# Patient Record
Sex: FEMALE | Race: WHITE | NOT HISPANIC OR LATINO | Employment: UNEMPLOYED | URBAN - METROPOLITAN AREA
[De-identification: names, ages, dates, MRNs, and addresses within clinical notes are randomized per-mention and may not be internally consistent; named-entity substitution may affect disease eponyms.]

---

## 2024-04-24 ENCOUNTER — OFFICE VISIT (OUTPATIENT)
Dept: FAMILY MEDICINE CLINIC | Facility: CLINIC | Age: 59
End: 2024-04-24
Payer: COMMERCIAL

## 2024-04-24 ENCOUNTER — LAB (OUTPATIENT)
Dept: LAB | Facility: HOSPITAL | Age: 59
End: 2024-04-24
Payer: COMMERCIAL

## 2024-04-24 VITALS
OXYGEN SATURATION: 98 % | HEART RATE: 80 BPM | SYSTOLIC BLOOD PRESSURE: 124 MMHG | WEIGHT: 171.6 LBS | BODY MASS INDEX: 27.58 KG/M2 | DIASTOLIC BLOOD PRESSURE: 82 MMHG | HEIGHT: 66 IN

## 2024-04-24 DIAGNOSIS — E11.9 TYPE 2 DIABETES MELLITUS WITHOUT COMPLICATION, WITHOUT LONG-TERM CURRENT USE OF INSULIN: ICD-10-CM

## 2024-04-24 DIAGNOSIS — E78.2 MIXED HYPERLIPIDEMIA: ICD-10-CM

## 2024-04-24 DIAGNOSIS — Z80.0 FAMILY HISTORY OF COLON CANCER: ICD-10-CM

## 2024-04-24 DIAGNOSIS — Z11.59 ENCOUNTER FOR HCV SCREENING TEST FOR LOW RISK PATIENT: ICD-10-CM

## 2024-04-24 DIAGNOSIS — Z23 IMMUNIZATION DUE: ICD-10-CM

## 2024-04-24 DIAGNOSIS — Z12.11 SCREENING FOR COLON CANCER: ICD-10-CM

## 2024-04-24 DIAGNOSIS — Z12.31 ENCOUNTER FOR SCREENING MAMMOGRAM FOR MALIGNANT NEOPLASM OF BREAST: ICD-10-CM

## 2024-04-24 DIAGNOSIS — E66.3 OVERWEIGHT (BMI 25.0-29.9): ICD-10-CM

## 2024-04-24 DIAGNOSIS — E03.9 ACQUIRED HYPOTHYROIDISM: ICD-10-CM

## 2024-04-24 DIAGNOSIS — Z00.00 ANNUAL PHYSICAL EXAM: Primary | ICD-10-CM

## 2024-04-24 DIAGNOSIS — Z00.00 ANNUAL PHYSICAL EXAM: ICD-10-CM

## 2024-04-24 LAB
25(OH)D3 SERPL-MCNC: 59 NG/ML (ref 30–100)
ALBUMIN SERPL-MCNC: 4.7 G/DL (ref 3.5–5.2)
ALBUMIN UR-MCNC: 1.4 MG/DL
ALBUMIN/GLOB SERPL: 1.8 G/DL
ALP SERPL-CCNC: 78 U/L (ref 39–117)
ALT SERPL W P-5'-P-CCNC: 13 U/L (ref 1–33)
ANION GAP SERPL CALCULATED.3IONS-SCNC: 12.6 MMOL/L (ref 5–15)
AST SERPL-CCNC: 20 U/L (ref 1–32)
BILIRUB SERPL-MCNC: 0.6 MG/DL (ref 0–1.2)
BUN SERPL-MCNC: 16 MG/DL (ref 6–20)
BUN/CREAT SERPL: 14 (ref 7–25)
CALCIUM SPEC-SCNC: 10 MG/DL (ref 8.6–10.5)
CHLORIDE SERPL-SCNC: 95 MMOL/L (ref 98–107)
CHOLEST SERPL-MCNC: 144 MG/DL (ref 0–200)
CO2 SERPL-SCNC: 29.4 MMOL/L (ref 22–29)
CREAT SERPL-MCNC: 1.14 MG/DL (ref 0.57–1)
CREAT UR-MCNC: 135.3 MG/DL
DEPRECATED RDW RBC AUTO: 37.9 FL (ref 37–54)
EGFRCR SERPLBLD CKD-EPI 2021: 56.6 ML/MIN/1.73
ERYTHROCYTE [DISTWIDTH] IN BLOOD BY AUTOMATED COUNT: 12.2 % (ref 12.3–15.4)
GLOBULIN UR ELPH-MCNC: 2.6 GM/DL
GLUCOSE SERPL-MCNC: 101 MG/DL (ref 65–99)
HBA1C MFR BLD: 6 % (ref 4.8–5.6)
HCT VFR BLD AUTO: 35.3 % (ref 34–46.6)
HCV AB SER QL: NORMAL
HDLC SERPL-MCNC: 59 MG/DL (ref 40–60)
HGB BLD-MCNC: 12 G/DL (ref 12–15.9)
LDLC SERPL CALC-MCNC: 64 MG/DL (ref 0–100)
LDLC/HDLC SERPL: 1.04 {RATIO}
MCH RBC QN AUTO: 29.1 PG (ref 26.6–33)
MCHC RBC AUTO-ENTMCNC: 34 G/DL (ref 31.5–35.7)
MCV RBC AUTO: 85.7 FL (ref 79–97)
MICROALBUMIN/CREAT UR: 10.3 MG/G (ref 0–29)
PLATELET # BLD AUTO: 266 10*3/MM3 (ref 140–450)
PMV BLD AUTO: 10.2 FL (ref 6–12)
POTASSIUM SERPL-SCNC: 3.3 MMOL/L (ref 3.5–5.2)
PROT SERPL-MCNC: 7.3 G/DL (ref 6–8.5)
RBC # BLD AUTO: 4.12 10*6/MM3 (ref 3.77–5.28)
SODIUM SERPL-SCNC: 137 MMOL/L (ref 136–145)
TRIGL SERPL-MCNC: 119 MG/DL (ref 0–150)
TSH SERPL DL<=0.05 MIU/L-ACNC: 0.33 UIU/ML (ref 0.27–4.2)
VLDLC SERPL-MCNC: 21 MG/DL (ref 5–40)
WBC NRBC COR # BLD AUTO: 6.09 10*3/MM3 (ref 3.4–10.8)

## 2024-04-24 PROCEDURE — 80061 LIPID PANEL: CPT

## 2024-04-24 PROCEDURE — 80050 GENERAL HEALTH PANEL: CPT

## 2024-04-24 PROCEDURE — 82306 VITAMIN D 25 HYDROXY: CPT

## 2024-04-24 PROCEDURE — 99386 PREV VISIT NEW AGE 40-64: CPT | Performed by: STUDENT IN AN ORGANIZED HEALTH CARE EDUCATION/TRAINING PROGRAM

## 2024-04-24 PROCEDURE — 90715 TDAP VACCINE 7 YRS/> IM: CPT | Performed by: STUDENT IN AN ORGANIZED HEALTH CARE EDUCATION/TRAINING PROGRAM

## 2024-04-24 PROCEDURE — 86803 HEPATITIS C AB TEST: CPT

## 2024-04-24 PROCEDURE — 99204 OFFICE O/P NEW MOD 45 MIN: CPT | Performed by: STUDENT IN AN ORGANIZED HEALTH CARE EDUCATION/TRAINING PROGRAM

## 2024-04-24 PROCEDURE — 82043 UR ALBUMIN QUANTITATIVE: CPT

## 2024-04-24 PROCEDURE — 83036 HEMOGLOBIN GLYCOSYLATED A1C: CPT

## 2024-04-24 PROCEDURE — 90471 IMMUNIZATION ADMIN: CPT | Performed by: STUDENT IN AN ORGANIZED HEALTH CARE EDUCATION/TRAINING PROGRAM

## 2024-04-24 PROCEDURE — 82570 ASSAY OF URINE CREATININE: CPT

## 2024-04-24 RX ORDER — EZETIMIBE 10 MG/1
1 TABLET ORAL DAILY
COMMUNITY
Start: 2024-01-14

## 2024-04-24 RX ORDER — METFORMIN HYDROCHLORIDE 500 MG/1
2 TABLET, EXTENDED RELEASE ORAL EVERY 12 HOURS SCHEDULED
COMMUNITY
Start: 2024-01-13

## 2024-04-24 RX ORDER — LISINOPRIL AND HYDROCHLOROTHIAZIDE 20; 12.5 MG/1; MG/1
2 TABLET ORAL DAILY
COMMUNITY

## 2024-04-24 RX ORDER — LEVOTHYROXINE SODIUM 88 UG/1
TABLET ORAL
COMMUNITY
Start: 2024-04-17

## 2024-04-24 RX ORDER — ATORVASTATIN CALCIUM 80 MG/1
1 TABLET, FILM COATED ORAL DAILY
COMMUNITY
Start: 2024-04-20

## 2024-04-24 NOTE — LETTER
[facility]  Vaccine Consent Form    Patient Name:  Alisha Quintana  Patient :  1965     Vaccine(s) Ordered    Tdap Vaccine => 6yo IM (BOOSTRIX)        Screening Checklist  The following questions should be completed prior to vaccination. If you answer “yes” to any question, it does not necessarily mean you should not be vaccinated. It just means we may need to clarify or ask more questions. If a question is unclear, please ask your healthcare provider to explain it.    Yes No   Any fever or moderate to severe illness today (mild illness and/or antibiotic treatment are not contraindications)?     Do you have a history of a serious reaction to any previous vaccinations, such as anaphylaxis, encephalopathy within 7 days, Guillain-Memphis syndrome within 6 weeks, seizure?     Have you received any live vaccine(s) (e.g MMR, MAMADOU) or any other vaccines in the last month (to ensure duplicate doses aren't given)?     Do you have an anaphylactic allergy to latex (DTaP, DTaP-IPV, Hep A, Hep B, MenB, RV, Td, Tdap), baker’s yeast (Hep B, HPV), polysorbates (RSV, nirsevimab, PCV 20, Rotavirrus, Tdap, Shingrix), or gelatin (MAMADOU, MMR)?     Do you have an anaphylactic allergy to neomycin (Rabies, MAMADOU, MMR, IPV, Hep A), polymyxin B (IPV), or streptomycin (IPV)?      Any cancer, leukemia, AIDS, or other immune system disorder? (MAMADOU, MMR, RV)     Do you have a parent, brother, or sister with an immune system problem (if immune competence of vaccine recipient clinically verified, can proceed)? (MMR, MAMADOU)     Any recent steroid treatments for >2 weeks, chemotherapy, or radiation treatment? (MAMADOU, MMR)     Have you received antibody-containing blood transfusions or IVIG in the past 11 months (recommended interval is dependent on product)? (MMR, MAMADOU)     Have you taken antiviral drugs (acyclovir, famciclovir, valacyclovir for MAMADOU) in the last 24 or 48 hours, respectively?      Are you pregnant or planning to become pregnant  "within 1 month? (MAMADOU, MMR, HPV, IPV, MenB, Abrexvy; For Hep B- refer to Engerix-B; For RSV - Abrysvo is indicated for 32-36 weeks of pregnancy from September to January)     For infants, have you ever been told your child has had intussusception or a medical emergency involving obstruction of the intestine (Rotavirus)? If not for an infant, can skip this question.         *Ordering Physicians/APC should be consulted if \"yes\" is checked by the patient or guardian above.  I have received, read, and understand the Vaccine Information Statement (VIS) for each vaccine ordered.  I have considered my or my child's health status as well as the health status of my close contacts.  I have taken the opportunity to discuss my vaccine questions with my or my child's health care provider.   I have requested that the ordered vaccine(s) be given to me or my child.  I understand the benefits and risks of the vaccines.  I understand that I should remain in the clinic for 15 minutes after receiving the vaccine(s).  _________________________________________________________  Signature of Patient or Parent/Legal Guardian ____________________  Date     "

## 2024-04-24 NOTE — PROGRESS NOTES
New Patient Office Visit      Patient Name: Alisha Quintana  : 1965   MRN: [id number]   Care Team: Patient Care Team:  Rocio Qureshi DO as PCP - General (Internal Medicine)    Chief Complaint:    Chief Complaint   Patient presents with    new patient preventative medicine service    Hypertension    Rash     Right side chest, itchy        History of Present Illness: Alisha Quintana is a 58 y.o. female who is here today to establish care.  She is  with 3 children. She is not currently working. She recently moved here from [place].     [phone]    HTN - well controlled with Lisinopril -HCTZ     HLD - taking Zetia and atorvastatin. Reports poor diet lately - has been eating out more often and has goals to improve her diet.    T2DM - has not had A1c in about 1 year however she reports previous well controlled around 5. She is compliant with metformin and Ozempic 1mg weekly. Has concerns for recent weight gain, struggles with satiety/cravings. Interested in increasing Ozempic.     Family History  Mother - multiple system atrophy (passed at 63)  Father - HTN, HLD, prostate cancer   Siblings - HTN, HLD  Brother - colon cancer (diagnosed at 45)      Subjective      Review of Systems:   Review of Systems - See HPI    Past Medical History:   Past Medical History:   Diagnosis Date    Diabetes mellitus 2017    Headache     Hyperlipidemia     Hypertension     Renal insufficiency        Past Surgical History:   Past Surgical History:   Procedure Laterality Date    COLONOSCOPY      HYSTERECTOMY  2017       Family History:   Family History   Problem Relation Age of Onset    Other Mother         Multiple system atrophy    Cancer Father     Hyperlipidemia Father     Cancer Brother     Hyperlipidemia Sister        Social History:   Social History     Socioeconomic History    Marital status:    Tobacco Use    Smoking status: Never     "Smokeless tobacco: Never   Vaping Use    Vaping status: Never Used   Substance and Sexual Activity    Alcohol use: Yes     Alcohol/week: 1.0 standard drink of alcohol     Types: 1 Shots of liquor per week    Drug use: Never    Sexual activity: Yes     Partners: Male     Birth control/protection: Hysterectomy     Comment:        Tobacco History:   Social History     Tobacco Use   Smoking Status Never   Smokeless Tobacco Never       Medications:     Current Outpatient Medications:     atorvastatin (LIPITOR) 80 MG tablet, Take 1 tablet by mouth Daily., Disp: , Rfl:     ezetimibe (ZETIA) 10 MG tablet, Take 1 tablet by mouth Daily., Disp: , Rfl:     levothyroxine (SYNTHROID, LEVOTHROID) 88 MCG tablet, take 1 tablet by mouth every day in the morning on an empty stomach, Disp: , Rfl:     lisinopril-hydrochlorothiazide (PRINZIDE,ZESTORETIC) 20-12.5 MG per tablet, Take 2 tablets by mouth Daily., Disp: , Rfl:     metFORMIN ER (GLUCOPHAGE-XR) 500 MG 24 hr tablet, Take 2 tablets by mouth Every 12 (Twelve) Hours., Disp: , Rfl:     Semaglutide (OZEMPIC, 1 MG/DOSE, SC), , Disp: , Rfl:     Allergies:   No Known Allergies    Objective     Physical Exam:  Vital Signs:   Vitals:    04/24/24 0950   BP: 124/82   Pulse: 80   SpO2: 98%   Weight: 77.8 kg (171 lb 9.6 oz)   Height: 167.6 cm (66\")     Body mass index is 27.7 kg/m².     Physical Exam  Vitals reviewed.   Constitutional:       Appearance: Normal appearance. She is not ill-appearing.   Cardiovascular:      Rate and Rhythm: Normal rate and regular rhythm.      Heart sounds: Normal heart sounds. No murmur heard.  Pulmonary:      Effort: Pulmonary effort is normal. No respiratory distress.      Breath sounds: Normal breath sounds. No wheezing.   Abdominal:      General: Abdomen is flat. There is no distension.      Palpations: Abdomen is soft.      Tenderness: There is no abdominal tenderness. There is no guarding or rebound.   Skin:     General: Skin is warm and dry. "   Neurological:      Mental Status: She is alert.   Psychiatric:         Mood and Affect: Mood normal.         Behavior: Behavior normal.         Judgment: Judgment normal.         Assessment / Plan      Assessment/Plan:   Problems Addressed This Visit  Diagnoses and all orders for this visit:    1. Annual physical exam (Primary)  -     CBC (No Diff); Future  -     Lipid Panel; Future  -     Hemoglobin A1c; Future  -     Comprehensive Metabolic Panel; Future  -     TSH; Future  -     Vitamin D,25-Hydroxy; Future  -     Ambulatory Referral For Screening Colonoscopy  -     Mammo Screening Digital Tomosynthesis Bilateral With CAD; Future  -     Hepatitis C antibody; Future    Healthcare Maintenance   Vaccines:  Covid booster declined  Tdap today    Cancer Screening  -Mammogram ordered today  -Colonoscopy due, ordered today. Brother with colon cancer diagnosed age 45  -Pap smear not indicated s/p MARAI for fibroids    Other  -Counseled on importance of maintaining healthy diet and routine exercise. Encouraged 150 min exercise per week.  -Blood pressure at goal   -Metabolic screening today    2. Family history of colon cancer  -     Ambulatory Referral For Screening Colonoscopy    3. Screening for colon cancer  -     Ambulatory Referral For Screening Colonoscopy    4. Encounter for screening mammogram for malignant neoplasm of breast  -     Mammo Screening Digital Tomosynthesis Bilateral With CAD; Future    5. Type 2 diabetes mellitus without complication, without long-term current use of insulin  -     Hemoglobin A1c; Future  -     Microalbumin / Creatinine Urine Ratio - Urine, Clean Catch; Future  Labs today     Continues to struggle with cravings, satiety. Interested in increasing Ozempic from 1mg to 2mg to help with this along with glycemic control. We will obtain labs first and the adjust if appropriate.     Eye exam annually at [facility] for uveitis, no hx of diabetic retinopathy     6. Mixed hyperlipidemia  -      Lipid Panel; Future  Continue statin and zetia  Goal LDL <70    7. Encounter for HCV screening test for low risk patient  -     Hepatitis C antibody; Future    8. Immunization due  -     Tdap Vaccine => 10yo IM (BOOSTRIX)    9. Overweight (BMI 25.0-29.9)  Counseled patient on importance of weight loss and maintaining healthy lifestyle. Recommended dietary modification and 150 minutes of exercise per week.    10. Acquired hypothyroidism  Continue synthroid - TFTs today    Plan of care reviewed with patient at the conclusion of today's visit. Education was provided regarding diagnosis and management.  Patient verbalizes understanding of and agreement with management plan.      Follow Up:   Return in about 3 months (around 7/24/2024).          DO DEEP Lyons RD  [facility]  [address]  [facility] [address]  Fax [phone]  Phone [phone]

## 2024-04-24 NOTE — Clinical Note
Previously following with primary care Dr. Shaggy Thorpe, [facility] [phone] - please request records

## 2024-04-25 DIAGNOSIS — E87.6 HYPOKALEMIA: ICD-10-CM

## 2024-04-25 DIAGNOSIS — E11.9 TYPE 2 DIABETES MELLITUS WITHOUT COMPLICATION, WITHOUT LONG-TERM CURRENT USE OF INSULIN: Primary | ICD-10-CM

## 2024-04-25 RX ORDER — POTASSIUM CHLORIDE 20 MEQ/1
20 TABLET, EXTENDED RELEASE ORAL 2 TIMES DAILY
Qty: 10 TABLET | Refills: 0 | Status: SHIPPED | OUTPATIENT
Start: 2024-04-25

## 2024-04-25 RX ORDER — SEMAGLUTIDE 2.68 MG/ML
2 INJECTION, SOLUTION SUBCUTANEOUS WEEKLY
Qty: 3 ML | Refills: 2 | Status: SHIPPED | OUTPATIENT
Start: 2024-04-25

## 2024-04-26 ENCOUNTER — PATIENT ROUNDING (BHMG ONLY) (OUTPATIENT)
Dept: FAMILY MEDICINE CLINIC | Facility: CLINIC | Age: 59
End: 2024-04-26
Payer: COMMERCIAL

## 2024-04-26 ENCOUNTER — PATIENT OUTREACH (OUTPATIENT)
Dept: FAMILY MEDICINE CLINIC | Facility: CLINIC | Age: 59
End: 2024-04-26
Payer: COMMERCIAL

## 2024-06-18 RX ORDER — SODIUM PICOSULFATE, MAGNESIUM OXIDE, AND ANHYDROUS CITRIC ACID 10; 3.5; 12 MG/160ML; G/160ML; G/160ML
350 LIQUID ORAL TAKE AS DIRECTED
Qty: 350 ML | Refills: 0 | Status: SHIPPED | OUTPATIENT
Start: 2024-06-18

## 2024-06-20 ENCOUNTER — TELEPHONE (OUTPATIENT)
Dept: GASTROENTEROLOGY | Facility: CLINIC | Age: 59
End: 2024-06-20
Payer: COMMERCIAL

## 2024-06-20 RX ORDER — ONDANSETRON 4 MG/1
4 TABLET, FILM COATED ORAL EVERY 6 HOURS
Qty: 6 TABLET | Refills: 0 | Status: SHIPPED | OUTPATIENT
Start: 2024-06-20

## 2024-06-20 RX ORDER — ONDANSETRON 4 MG/1
4 TABLET, FILM COATED ORAL EVERY 6 HOURS
Qty: 6 TABLET | Refills: 0 | Status: SHIPPED | OUTPATIENT
Start: 2024-06-20 | End: 2024-06-20

## 2024-06-20 RX ORDER — ONDANSETRON 4 MG/1
4 TABLET, ORALLY DISINTEGRATING ORAL EVERY 8 HOURS PRN
Qty: 6 TABLET | Refills: 0 | Status: SHIPPED | OUTPATIENT
Start: 2024-06-20

## 2024-06-20 NOTE — TELEPHONE ENCOUNTER
RECEIVED FAX FROM [employer]. ONDANSETRON 4 MG NOT COVERED. ALTERNATIVE COVERED ONDANSETRON 4MG ODT .

## 2024-06-20 NOTE — TELEPHONE ENCOUNTER
WHILE DOING HER CONFIRMATION CALL FOR HER PROCEDURE SCHEDULE ON 6/28 THE PT SAID THAT DURING HER LAST PREP SHE HAD TROUBLE KEEPING THE PREP DOWN AND IS REQUESTING SOME JOVIAN BE CALLED INTO HER PHARMACY TO HELP WITH THIS.

## 2024-06-28 ENCOUNTER — OUTSIDE FACILITY SERVICE (OUTPATIENT)
Dept: GASTROENTEROLOGY | Facility: CLINIC | Age: 59
End: 2024-06-28
Payer: COMMERCIAL

## 2024-06-28 PROCEDURE — 88305 TISSUE EXAM BY PATHOLOGIST: CPT | Performed by: INTERNAL MEDICINE

## 2024-07-01 ENCOUNTER — LAB REQUISITION (OUTPATIENT)
Dept: LAB | Facility: HOSPITAL | Age: 59
End: 2024-07-01
Payer: COMMERCIAL

## 2024-07-01 DIAGNOSIS — K57.30 DIVERTICULOSIS OF LARGE INTESTINE WITHOUT PERFORATION OR ABSCESS WITHOUT BLEEDING: ICD-10-CM

## 2024-07-01 DIAGNOSIS — Z80.0 FAMILY HISTORY OF MALIGNANT NEOPLASM OF DIGESTIVE ORGANS: ICD-10-CM

## 2024-07-01 DIAGNOSIS — Z86.010 PERSONAL HISTORY OF COLONIC POLYPS: ICD-10-CM

## 2024-07-01 DIAGNOSIS — Z12.11 ENCOUNTER FOR SCREENING FOR MALIGNANT NEOPLASM OF COLON: ICD-10-CM

## 2024-07-01 DIAGNOSIS — K64.8 OTHER HEMORRHOIDS: ICD-10-CM

## 2024-07-02 LAB — REF LAB TEST METHOD: NORMAL

## 2024-07-25 ENCOUNTER — LAB (OUTPATIENT)
Dept: LAB | Facility: HOSPITAL | Age: 59
End: 2024-07-25
Payer: COMMERCIAL

## 2024-07-25 ENCOUNTER — OFFICE VISIT (OUTPATIENT)
Dept: FAMILY MEDICINE CLINIC | Facility: CLINIC | Age: 59
End: 2024-07-25
Payer: COMMERCIAL

## 2024-07-25 VITALS
BODY MASS INDEX: 26 KG/M2 | DIASTOLIC BLOOD PRESSURE: 86 MMHG | HEIGHT: 66 IN | SYSTOLIC BLOOD PRESSURE: 124 MMHG | WEIGHT: 161.8 LBS

## 2024-07-25 DIAGNOSIS — R51.9 RECURRENT OCCIPITAL HEADACHE: ICD-10-CM

## 2024-07-25 DIAGNOSIS — E78.2 MIXED HYPERLIPIDEMIA: ICD-10-CM

## 2024-07-25 DIAGNOSIS — E11.9 TYPE 2 DIABETES MELLITUS WITHOUT COMPLICATION, WITHOUT LONG-TERM CURRENT USE OF INSULIN: ICD-10-CM

## 2024-07-25 DIAGNOSIS — E87.6 HYPOKALEMIA: ICD-10-CM

## 2024-07-25 DIAGNOSIS — G47.9 SLEEPING DIFFICULTY: ICD-10-CM

## 2024-07-25 DIAGNOSIS — E11.9 TYPE 2 DIABETES MELLITUS WITHOUT COMPLICATION, WITHOUT LONG-TERM CURRENT USE OF INSULIN: Primary | ICD-10-CM

## 2024-07-25 LAB
EXPIRATION DATE: NORMAL
HBA1C MFR BLD: 5.4 % (ref 4.5–5.7)
Lab: NORMAL

## 2024-07-25 PROCEDURE — 80048 BASIC METABOLIC PNL TOTAL CA: CPT

## 2024-07-25 PROCEDURE — 99214 OFFICE O/P EST MOD 30 MIN: CPT | Performed by: STUDENT IN AN ORGANIZED HEALTH CARE EDUCATION/TRAINING PROGRAM

## 2024-07-25 PROCEDURE — 83036 HEMOGLOBIN GLYCOSYLATED A1C: CPT | Performed by: STUDENT IN AN ORGANIZED HEALTH CARE EDUCATION/TRAINING PROGRAM

## 2024-07-25 RX ORDER — EZETIMIBE 10 MG/1
10 TABLET ORAL DAILY
Qty: 90 TABLET | Refills: 0 | Status: SHIPPED | OUTPATIENT
Start: 2024-07-25

## 2024-07-25 RX ORDER — SEMAGLUTIDE 2.68 MG/ML
2 INJECTION, SOLUTION SUBCUTANEOUS WEEKLY
Qty: 9 ML | Refills: 3 | Status: SHIPPED | OUTPATIENT
Start: 2024-07-25

## 2024-07-25 RX ORDER — TRAZODONE HYDROCHLORIDE 50 MG/1
50 TABLET ORAL NIGHTLY
Qty: 30 TABLET | Refills: 2 | Status: SHIPPED | OUTPATIENT
Start: 2024-07-25

## 2024-07-25 NOTE — PROGRESS NOTES
Established Office Visit      Patient Name: Alisha Quintana  : 1965   MRN: [id number]   Care Team: Patient Care Team:  Rocio Qureshi DO as PCP - General (Internal Medicine)    Chief Complaint:    Chief Complaint   Patient presents with    Diabetes    Obesity    Insomnia    Headache       History of Present Illness: Alisha Quintana is a 59 y.o. female with HTN, HLD, T2DM who is here today to follow up with chronic medical problems.    Reports difficulty with sleep off and over the past several years. Over the past few months it has been particularly worse. Has difficulty falling asleep some nights. She wakes up around 3am and cannot fall back asleep. She tries to follow same sleep/wake cycle each day - goes to bed around 10 and would like to wake up around 7am each day.  Melatonin, unisom, advil PM ineffective.   No snoring or witnessed apneic episodes. She has had sleep study in the past which reports was normal. She does not feel rested when she wakes up.     Reports history of headaches off and on since age 18. Particularly irritating lately. She is following with PT and dry needling has been helpful. Describes headaches as always starting at posterior base and radiating upwards throughout skull. Occurring all day. No specific pattern.     T2DM - compliant with ozempic and metformin. Tolerating this well. No side effects.   She has lost 10lb in the past 3 months.       Subjective      Review of Systems:   Review of Systems - See HPI    I have reviewed and the following portions of the patient's history were updated as appropriate: past family history, past medical history, past social history, past surgical history and problem list.    Medications:     Current Outpatient Medications:     atorvastatin (LIPITOR) 80 MG tablet, Take 1 tablet by mouth Daily., Disp: , Rfl:     ezetimibe (ZETIA) 10 MG tablet, Take 1 tablet by mouth Daily., Disp: 90 tablet, Rfl: 0    levothyroxine (SYNTHROID,  "LEVOTHROID) 88 MCG tablet, take 1 tablet by mouth every day in the morning on an empty stomach, Disp: , Rfl:     lisinopril-hydrochlorothiazide (PRINZIDE,ZESTORETIC) 20-12.5 MG per tablet, Take 2 tablets by mouth Daily., Disp: , Rfl:     metFORMIN ER (GLUCOPHAGE-XR) 500 MG 24 hr tablet, Take 2 tablets by mouth Every 12 (Twelve) Hours., Disp: , Rfl:     ondansetron (ZOFRAN) 4 MG tablet, Take 1 tablet by mouth Every 6 (Six) Hours., Disp: 6 tablet, Rfl: 0    ondansetron ODT (ZOFRAN-ODT) 4 MG disintegrating tablet, Place 1 tablet on the tongue Every 8 (Eight) Hours As Needed for Nausea or Vomiting (FOR COLONOSCOPY PREP)., Disp: 6 tablet, Rfl: 0    Semaglutide, 2 MG/DOSE, (Ozempic, 2 MG/DOSE,) 8 MG/3ML solution pen-injector, Inject 2 mg under the skin into the appropriate area as directed 1 (One) Time Per Week., Disp: 9 mL, Rfl: 3    Sod Picosulfate-Mag Ox-Cit Acd (Clenpiq) 10-3.5-12 MG-GM -GM/160ML solution, Take 350 mL by mouth Take As Directed., Disp: 350 mL, Rfl: 0    traZODone (DESYREL) 50 MG tablet, Take 1 tablet by mouth Every Night., Disp: 30 tablet, Rfl: 2    Allergies:   No Known Allergies    Objective     Physical Exam:  Vital Signs:   Vitals:    07/25/24 1058   BP: 124/86   Weight: 73.4 kg (161 lb 12.8 oz)   Height: 167.6 cm (66\")     Body mass index is 26.12 kg/m².       Assessment / Plan      Assessment/Plan:   Problems Addressed This Visit  Diagnoses and all orders for this visit:    1. Type 2 diabetes mellitus without complication, without long-term current use of insulin (Primary)  -     POC Glycosylated Hemoglobin (Hb A1C)  -     ezetimibe (ZETIA) 10 MG tablet; Take 1 tablet by mouth Daily.  Dispense: 90 tablet; Refill: 0  -     Semaglutide, 2 MG/DOSE, (Ozempic, 2 MG/DOSE,) 8 MG/3ML solution pen-injector; Inject 2 mg under the skin into the appropriate area as directed 1 (One) Time Per Week.  Dispense: 9 mL; Refill: 3  -     Basic metabolic panel; Future    A1c very well controlled 5.4%   Continue " Metformin and Ozempic   Continue ACEi and statin     2. Mixed hyperlipidemia  -     ezetimibe (ZETIA) 10 MG tablet; Take 1 tablet by mouth Daily.  Dispense: 90 tablet; Refill: 0  At goal LDL <70 with zetia and statin    3. Hypokalemia  -     Basic metabolic panel; Future  Likely related to HCTZ. If persistent - consider alternative antihypertensive options     4. Sleeping difficulty  -     traZODone (DESYREL) 50 MG tablet; Take 1 tablet by mouth Every Night.  Dispense: 30 tablet; Refill: 2  Encouraged healthy sleep hygiene habits including avoiding late night meals/EtOH, keeping room dark and quiet, avoiding screen time prior to bed, and maintaining consistent sleep/wake schedule throughout the week.  Switch lisinopril HCTZ to morning to help with nocturia   Trial of trazodone prn     5. Recurrent occipital headache  Cervicogenic vs occipital neuralgia?   Discussed treatment options  She would like to avoid further medication (ie amitriptyline) and instead try optimizing sleep with trazodone trial as above  Continue dry needling. Could consider referral to neuro to consider occipital nerve block         Plan of care reviewed with patient at the conclusion of today's visit. Education was provided regarding diagnosis and management.  Patient verbalizes understanding of and agreement with management plan.    Follow Up:   Return for 6 weeks for sleep and headache follow up .        DO DEEP Lyons RD  [facility]  [address]  [facility] [phone]  Fax [phone]  Phone [phone]

## 2024-07-26 LAB
ANION GAP SERPL CALCULATED.3IONS-SCNC: 14.1 MMOL/L (ref 5–15)
BUN SERPL-MCNC: 10 MG/DL (ref 6–20)
BUN/CREAT SERPL: 8.8 (ref 7–25)
CALCIUM SPEC-SCNC: 10 MG/DL (ref 8.6–10.5)
CHLORIDE SERPL-SCNC: 95 MMOL/L (ref 98–107)
CO2 SERPL-SCNC: 28.9 MMOL/L (ref 22–29)
CREAT SERPL-MCNC: 1.13 MG/DL (ref 0.57–1)
EGFRCR SERPLBLD CKD-EPI 2021: 56.9 ML/MIN/1.73
GLUCOSE SERPL-MCNC: 95 MG/DL (ref 65–99)
POTASSIUM SERPL-SCNC: 3.6 MMOL/L (ref 3.5–5.2)
SODIUM SERPL-SCNC: 138 MMOL/L (ref 136–145)

## 2024-07-29 DIAGNOSIS — E78.2 MIXED HYPERLIPIDEMIA: ICD-10-CM

## 2024-07-29 DIAGNOSIS — E11.9 TYPE 2 DIABETES MELLITUS WITHOUT COMPLICATION, WITHOUT LONG-TERM CURRENT USE OF INSULIN: ICD-10-CM

## 2024-07-29 RX ORDER — EZETIMIBE 10 MG/1
10 TABLET ORAL DAILY
Qty: 90 TABLET | Refills: 0 | OUTPATIENT
Start: 2024-07-29

## 2024-08-01 ENCOUNTER — HOSPITAL ENCOUNTER (OUTPATIENT)
Dept: MAMMOGRAPHY | Facility: HOSPITAL | Age: 59
Discharge: HOME | End: 2024-08-01
Admitting: STUDENT IN AN ORGANIZED HEALTH CARE EDUCATION/TRAINING PROGRAM
Payer: COMMERCIAL

## 2024-08-01 DIAGNOSIS — Z00.00 ANNUAL PHYSICAL EXAM: ICD-10-CM

## 2024-08-01 DIAGNOSIS — Z12.31 ENCOUNTER FOR SCREENING MAMMOGRAM FOR MALIGNANT NEOPLASM OF BREAST: ICD-10-CM

## 2024-08-01 PROCEDURE — 77067 SCR MAMMO BI INCL CAD: CPT

## 2024-08-01 PROCEDURE — 77063 BREAST TOMOSYNTHESIS BI: CPT

## 2024-09-10 ENCOUNTER — TELEMEDICINE (OUTPATIENT)
Dept: FAMILY MEDICINE CLINIC | Facility: CLINIC | Age: 59
End: 2024-09-10
Payer: COMMERCIAL

## 2024-09-10 DIAGNOSIS — E11.9 TYPE 2 DIABETES MELLITUS WITHOUT COMPLICATION, WITHOUT LONG-TERM CURRENT USE OF INSULIN: ICD-10-CM

## 2024-09-10 DIAGNOSIS — G47.9 SLEEPING DIFFICULTY: Primary | ICD-10-CM

## 2024-09-10 DIAGNOSIS — N18.31 STAGE 3A CHRONIC KIDNEY DISEASE: ICD-10-CM

## 2024-09-10 PROCEDURE — 99214 OFFICE O/P EST MOD 30 MIN: CPT | Performed by: STUDENT IN AN ORGANIZED HEALTH CARE EDUCATION/TRAINING PROGRAM

## 2024-09-10 RX ORDER — TRAZODONE HYDROCHLORIDE 100 MG/1
100 TABLET ORAL NIGHTLY
Qty: 30 TABLET | Refills: 5 | Status: SHIPPED | OUTPATIENT
Start: 2024-09-10

## 2024-09-11 VITALS — BODY MASS INDEX: 24.75 KG/M2 | HEIGHT: 66 IN | WEIGHT: 154 LBS

## 2024-11-08 DIAGNOSIS — E03.9 ACQUIRED HYPOTHYROIDISM: Primary | ICD-10-CM

## 2024-11-08 DIAGNOSIS — E11.9 TYPE 2 DIABETES MELLITUS WITHOUT COMPLICATION, WITHOUT LONG-TERM CURRENT USE OF INSULIN: ICD-10-CM

## 2024-11-08 DIAGNOSIS — E78.2 MIXED HYPERLIPIDEMIA: ICD-10-CM

## 2024-11-08 DIAGNOSIS — I10 PRIMARY HYPERTENSION: ICD-10-CM

## 2024-11-11 RX ORDER — EZETIMIBE 10 MG/1
10 TABLET ORAL DAILY
Qty: 90 TABLET | Refills: 0 | Status: SHIPPED | OUTPATIENT
Start: 2024-11-11

## 2024-11-11 RX ORDER — LISINOPRIL AND HYDROCHLOROTHIAZIDE 12.5; 2 MG/1; MG/1
2 TABLET ORAL DAILY
Qty: 60 TABLET | Refills: 2 | Status: SHIPPED | OUTPATIENT
Start: 2024-11-11

## 2024-11-11 RX ORDER — ATORVASTATIN CALCIUM 80 MG/1
80 TABLET, FILM COATED ORAL DAILY
Qty: 90 TABLET | Refills: 3 | Status: SHIPPED | OUTPATIENT
Start: 2024-11-11

## 2024-11-11 RX ORDER — LEVOTHYROXINE SODIUM 88 UG/1
88 TABLET ORAL DAILY
Qty: 30 TABLET | Refills: 5 | Status: SHIPPED | OUTPATIENT
Start: 2024-11-11

## 2024-11-11 RX ORDER — SEMAGLUTIDE 2.68 MG/ML
2 INJECTION, SOLUTION SUBCUTANEOUS WEEKLY
Qty: 9 ML | Refills: 1 | Status: SHIPPED | OUTPATIENT
Start: 2024-11-11

## 2024-11-11 NOTE — TELEPHONE ENCOUNTER
Rx Refill Note  Requested Prescriptions     Pending Prescriptions Disp Refills    atorvastatin (LIPITOR) 80 MG tablet 90 tablet      Sig: Take 1 tablet by mouth Daily.    levothyroxine (SYNTHROID, LEVOTHROID) 88 MCG tablet      lisinopril-hydrochlorothiazide (PRINZIDE,ZESTORETIC) 20-12.5 MG per tablet       Sig: Take 2 tablets by mouth Daily.     Signed Prescriptions Disp Refills    ezetimibe (ZETIA) 10 MG tablet 90 tablet 0     Sig: Take 1 tablet by mouth Daily.     Authorizing Provider: TERELL LOZANO     Ordering User: GIOVANA JORDAN    Semaglutide, 2 MG/DOSE, (Ozempic, 2 MG/DOSE,) 8 MG/3ML solution pen-injector 9 mL 1     Sig: Inject 2 mg under the skin into the appropriate area as directed 1 (One) Time Per Week.     Authorizing Provider: TERELL LOZANO     Ordering User: GIOVANA JORDAN      Last office visit with prescribing clinician: 7/25/2024   Last telemedicine visit with prescribing clinician: 9/10/2024   Next office visit with prescribing clinician: 1/15/2025                         Would you like a call back once the refill request has been completed: [] Yes [] No    If the office needs to give you a call back, can they leave a voicemail: [] Yes [] No    Giovana Jordan MA  11/11/24, 07:49 EST

## 2024-11-13 DIAGNOSIS — E78.2 MIXED HYPERLIPIDEMIA: ICD-10-CM

## 2024-11-13 DIAGNOSIS — E03.9 ACQUIRED HYPOTHYROIDISM: ICD-10-CM

## 2024-11-13 DIAGNOSIS — E11.9 TYPE 2 DIABETES MELLITUS WITHOUT COMPLICATION, WITHOUT LONG-TERM CURRENT USE OF INSULIN: ICD-10-CM

## 2024-11-13 DIAGNOSIS — I10 PRIMARY HYPERTENSION: ICD-10-CM

## 2024-11-14 RX ORDER — LISINOPRIL AND HYDROCHLOROTHIAZIDE 12.5; 2 MG/1; MG/1
2 TABLET ORAL DAILY
Qty: 60 TABLET | Refills: 2 | OUTPATIENT
Start: 2024-11-14

## 2024-11-14 RX ORDER — ATORVASTATIN CALCIUM 80 MG/1
80 TABLET, FILM COATED ORAL DAILY
Qty: 90 TABLET | Refills: 3 | OUTPATIENT
Start: 2024-11-14

## 2024-11-14 RX ORDER — LEVOTHYROXINE SODIUM 88 UG/1
88 TABLET ORAL DAILY
Qty: 30 TABLET | Refills: 5 | OUTPATIENT
Start: 2024-11-14

## 2024-11-14 RX ORDER — SEMAGLUTIDE 2.68 MG/ML
2 INJECTION, SOLUTION SUBCUTANEOUS WEEKLY
Qty: 9 ML | Refills: 1 | OUTPATIENT
Start: 2024-11-14

## 2024-11-14 RX ORDER — EZETIMIBE 10 MG/1
10 TABLET ORAL DAILY
Qty: 90 TABLET | Refills: 0 | OUTPATIENT
Start: 2024-11-14

## 2024-11-22 ENCOUNTER — OFFICE VISIT (OUTPATIENT)
Dept: FAMILY MEDICINE CLINIC | Facility: CLINIC | Age: 59
End: 2024-11-22
Payer: COMMERCIAL

## 2024-11-22 VITALS
HEIGHT: 66 IN | SYSTOLIC BLOOD PRESSURE: 122 MMHG | WEIGHT: 160.4 LBS | DIASTOLIC BLOOD PRESSURE: 80 MMHG | BODY MASS INDEX: 25.78 KG/M2 | TEMPERATURE: 98.7 F | OXYGEN SATURATION: 99 % | HEART RATE: 80 BPM

## 2024-11-22 DIAGNOSIS — J40 BRONCHITIS: Primary | ICD-10-CM

## 2024-11-22 LAB
EXPIRATION DATE: NORMAL
FLUAV AG UPPER RESP QL IA.RAPID: NOT DETECTED
FLUBV AG UPPER RESP QL IA.RAPID: NOT DETECTED
INTERNAL CONTROL: NORMAL
Lab: NORMAL
SARS-COV-2 AG UPPER RESP QL IA.RAPID: NOT DETECTED

## 2024-11-22 PROCEDURE — 87428 SARSCOV & INF VIR A&B AG IA: CPT | Performed by: STUDENT IN AN ORGANIZED HEALTH CARE EDUCATION/TRAINING PROGRAM

## 2024-11-22 PROCEDURE — 99213 OFFICE O/P EST LOW 20 MIN: CPT | Performed by: STUDENT IN AN ORGANIZED HEALTH CARE EDUCATION/TRAINING PROGRAM

## 2024-11-22 RX ORDER — BROMPHENIRAMINE MALEATE, PSEUDOEPHEDRINE HYDROCHLORIDE, AND DEXTROMETHORPHAN HYDROBROMIDE 2; 30; 10 MG/5ML; MG/5ML; MG/5ML
5 SYRUP ORAL 4 TIMES DAILY PRN
Qty: 118 ML | Refills: 1 | Status: SHIPPED | OUTPATIENT
Start: 2024-11-22

## 2024-11-22 NOTE — PROGRESS NOTES
Established Office Visit      Patient Name: Alisha Quintana  : 1965   MRN: [id number]   Care Team: Patient Care Team:  Rocio Qureshi DO as PCP - General (Internal Medicine)    Chief Complaint:    Chief Complaint   Patient presents with    Headache     X3 weeks     Ear Fullness       History of Present Illness: Alisha Quintana is a 59 y.o. female with HTN, HLD, T2DM, CKD3a, insomnia who is here today to follow up with headache and ear pain.    Patient reports about 3 weeks ago she felt congestion, runny nose. She took some left over prednisone and symptoms improved temporarily. About 1 week ago she reports symptoms once again worsened cough with mucus production. She has sore throat which she feels is raw from coughing.   No SOA, wheezing, fever. Denies GI symptoms.   She is taking dayquil, nyquil, cough syrup and cough drops without improvement.     Subjective      Review of Systems:   Review of Systems - See HPI    I have reviewed and the following portions of the patient's history were updated as appropriate: past family history, past medical history, past social history, past surgical history and problem list.    Medications:     Current Outpatient Medications:     atorvastatin (LIPITOR) 80 MG tablet, Take 1 tablet by mouth Daily., Disp: 90 tablet, Rfl: 3    ezetimibe (ZETIA) 10 MG tablet, Take 1 tablet by mouth Daily., Disp: 90 tablet, Rfl: 0    levothyroxine (SYNTHROID, LEVOTHROID) 88 MCG tablet, Take 1 tablet by mouth Daily., Disp: 30 tablet, Rfl: 5    lisinopril-hydrochlorothiazide (PRINZIDE,ZESTORETIC) 20-12.5 MG per tablet, Take 2 tablets by mouth Daily., Disp: 60 tablet, Rfl: 2    metFORMIN ER (GLUCOPHAGE-XR) 500 MG 24 hr tablet, Take 2 tablets by mouth Every 12 (Twelve) Hours., Disp: , Rfl:     ondansetron ODT (ZOFRAN-ODT) 4 MG disintegrating tablet, Place 1 tablet on the tongue Every 8 (Eight) Hours As Needed for Nausea or Vomiting (FOR COLONOSCOPY PREP)., Disp: 6 tablet, Rfl:  "0    Semaglutide, 2 MG/DOSE, (Ozempic, 2 MG/DOSE,) 8 MG/3ML solution pen-injector, Inject 2 mg under the skin into the appropriate area as directed 1 (One) Time Per Week., Disp: 9 mL, Rfl: 1    traZODone (DESYREL) 100 MG tablet, Take 1 tablet by mouth Every Night., Disp: 30 tablet, Rfl: 5    amoxicillin-clavulanate (AUGMENTIN) 875-125 MG per tablet, Take 1 tablet by mouth 2 (Two) Times a Day for 7 days., Disp: 14 tablet, Rfl: 0    brompheniramine-pseudoephedrine-DM 30-2-10 MG/5ML syrup, Take 5 mL by mouth 4 (Four) Times a Day As Needed for Cough or Congestion., Disp: 118 mL, Rfl: 1    Allergies:   No Known Allergies    Objective     Physical Exam:  Vital Signs:   Vitals:    11/22/24 0912   BP: 122/80   Pulse: 80   Temp: 98.7 °F (37.1 °C)   SpO2: 99%   Weight: 72.8 kg (160 lb 6.4 oz)   Height: 167.6 cm (66\")     Body mass index is 25.89 kg/m².     Physical Exam  Vitals reviewed.   Constitutional:       Appearance: Normal appearance. She is ill-appearing.      Comments: Hoarse voice quality    HENT:      Right Ear: Tympanic membrane, ear canal and external ear normal.      Left Ear: Tympanic membrane, ear canal and external ear normal.      Nose: Congestion present.      Mouth/Throat:      Pharynx: No oropharyngeal exudate or posterior oropharyngeal erythema.   Cardiovascular:      Rate and Rhythm: Normal rate.      Pulses: Normal pulses.   Pulmonary:      Effort: Pulmonary effort is normal. No respiratory distress.      Breath sounds: Rhonchi present. No wheezing.   Skin:     General: Skin is warm and dry.   Neurological:      Mental Status: She is alert.   Psychiatric:         Mood and Affect: Mood normal.         Behavior: Behavior normal.         Judgment: Judgment normal.         Assessment / Plan      Assessment/Plan:   Problems Addressed This Visit  Diagnoses and all orders for this visit:    1. Bronchitis (Primary)  -     POCT SARS-CoV-2 Antigen HAYLEY + Flu  -     amoxicillin-clavulanate (AUGMENTIN) 875-125 MG " per tablet; Take 1 tablet by mouth 2 (Two) Times a Day for 7 days.  Dispense: 14 tablet; Refill: 0  -     brompheniramine-pseudoephedrine-DM 30-2-10 MG/5ML syrup; Take 5 mL by mouth 4 (Four) Times a Day As Needed for Cough or Congestion.  Dispense: 118 mL; Refill: 1    Covid and flu negative  Symptoms ongoing for >7 days without improvement with conservative management  Recommend tx with augmentin BID x 7 days and bromphed DM prn cough/congestion  Will let me know if symptoms do not improve and in that case I would recommend CXR for further eval      Plan of care reviewed with patient at the conclusion of today's visit. Education was provided regarding diagnosis and management.  Patient verbalizes understanding of and agreement with management plan.    Follow Up: as currently scheduled   No follow-ups on file.        DO DEEP Lyons RD  [facility]  [address]  [facility] [phone]  Fax [phone]  Phone [phone]

## 2024-11-26 ENCOUNTER — HOSPITAL ENCOUNTER (EMERGENCY)
Facility: HOSPITAL | Age: 59
Discharge: HOME | End: 2024-11-26
Attending: STUDENT IN AN ORGANIZED HEALTH CARE EDUCATION/TRAINING PROGRAM
Payer: COMMERCIAL

## 2024-11-26 ENCOUNTER — APPOINTMENT (OUTPATIENT)
Dept: GENERAL RADIOLOGY | Facility: HOSPITAL | Age: 59
End: 2024-11-26
Payer: COMMERCIAL

## 2024-11-26 VITALS
WEIGHT: 159 LBS | TEMPERATURE: 97.8 F | HEIGHT: 67 IN | HEART RATE: 102 BPM | RESPIRATION RATE: 20 BRPM | DIASTOLIC BLOOD PRESSURE: 74 MMHG | SYSTOLIC BLOOD PRESSURE: 140 MMHG | OXYGEN SATURATION: 97 % | BODY MASS INDEX: 24.96 KG/M2

## 2024-11-26 DIAGNOSIS — B34.9 VIRAL ILLNESS: Primary | ICD-10-CM

## 2024-11-26 LAB
ALBUMIN SERPL-MCNC: 4.4 G/DL (ref 3.5–5.2)
ALBUMIN/GLOB SERPL: 1.4 G/DL
ALP SERPL-CCNC: 72 U/L (ref 39–117)
ALT SERPL W P-5'-P-CCNC: 18 U/L (ref 1–33)
ANION GAP SERPL CALCULATED.3IONS-SCNC: 15.4 MMOL/L (ref 5–15)
AST SERPL-CCNC: 18 U/L (ref 1–32)
BASOPHILS # BLD MANUAL: 0.07 10*3/MM3 (ref 0–0.2)
BASOPHILS NFR BLD MANUAL: 1 % (ref 0–1.5)
BILIRUB SERPL-MCNC: 0.6 MG/DL (ref 0–1.2)
BUN SERPL-MCNC: 20 MG/DL (ref 6–20)
BUN/CREAT SERPL: 13.5 (ref 7–25)
CALCIUM SPEC-SCNC: 9.2 MG/DL (ref 8.6–10.5)
CHLORIDE SERPL-SCNC: 88 MMOL/L (ref 98–107)
CO2 SERPL-SCNC: 29.6 MMOL/L (ref 22–29)
CREAT SERPL-MCNC: 1.48 MG/DL (ref 0.57–1)
D DIMER PPP FEU-MCNC: <0.27 MCGFEU/ML (ref 0–0.57)
DEPRECATED RDW RBC AUTO: 39.6 FL (ref 37–54)
EGFRCR SERPLBLD CKD-EPI 2021: 41.1 ML/MIN/1.73
ERYTHROCYTE [DISTWIDTH] IN BLOOD BY AUTOMATED COUNT: 12.5 % (ref 12.3–15.4)
FLUAV RNA RESP QL NAA+PROBE: NOT DETECTED
FLUBV RNA RESP QL NAA+PROBE: NOT DETECTED
GLOBULIN UR ELPH-MCNC: 3.1 GM/DL
GLUCOSE SERPL-MCNC: 113 MG/DL (ref 65–99)
HCT VFR BLD AUTO: 38.4 % (ref 34–46.6)
HGB BLD-MCNC: 13.3 G/DL (ref 12–15.9)
HOLD SPECIMEN: NORMAL
HOLD SPECIMEN: NORMAL
LYMPHOCYTES # BLD MANUAL: 2.5 10*3/MM3 (ref 0.7–3.1)
LYMPHOCYTES NFR BLD MANUAL: 6 % (ref 5–12)
MCH RBC QN AUTO: 29.9 PG (ref 26.6–33)
MCHC RBC AUTO-ENTMCNC: 34.6 G/DL (ref 31.5–35.7)
MCV RBC AUTO: 86.3 FL (ref 79–97)
MONOCYTES # BLD: 0.44 10*3/MM3 (ref 0.1–0.9)
NEUTROPHILS # BLD AUTO: 4.26 10*3/MM3 (ref 1.7–7)
NEUTROPHILS NFR BLD MANUAL: 57 % (ref 42.7–76)
NEUTS BAND NFR BLD MANUAL: 1 % (ref 0–5)
NT-PROBNP SERPL-MCNC: 38.2 PG/ML (ref 0–900)
OTHER CELLS %: 1 % (ref 0–0)
PLAT MORPH BLD: NORMAL
PLATELET # BLD AUTO: 280 10*3/MM3 (ref 140–450)
PMV BLD AUTO: 9.4 FL (ref 6–12)
POTASSIUM SERPL-SCNC: 3.1 MMOL/L (ref 3.5–5.2)
PROT SERPL-MCNC: 7.5 G/DL (ref 6–8.5)
RBC # BLD AUTO: 4.45 10*6/MM3 (ref 3.77–5.28)
RBC MORPH BLD: NORMAL
S PYO AG THROAT QL: NEGATIVE
SARS-COV-2 RNA RESP QL NAA+PROBE: NOT DETECTED
SODIUM SERPL-SCNC: 133 MMOL/L (ref 136–145)
TROPONIN T SERPL HS-MCNC: <6 NG/L
VARIANT LYMPHS NFR BLD MANUAL: 29 % (ref 19.6–45.3)
VARIANT LYMPHS NFR BLD MANUAL: 5 % (ref 0–5)
WBC MORPH BLD: NORMAL
WBC NRBC COR # BLD AUTO: 7.34 10*3/MM3 (ref 3.4–10.8)
WHOLE BLOOD HOLD COAG: NORMAL
WHOLE BLOOD HOLD SPECIMEN: NORMAL

## 2024-11-26 PROCEDURE — 87636 SARSCOV2 & INF A&B AMP PRB: CPT | Performed by: NURSE PRACTITIONER

## 2024-11-26 PROCEDURE — 84484 ASSAY OF TROPONIN QUANT: CPT | Performed by: STUDENT IN AN ORGANIZED HEALTH CARE EDUCATION/TRAINING PROGRAM

## 2024-11-26 PROCEDURE — 85025 COMPLETE CBC W/AUTO DIFF WBC: CPT | Performed by: STUDENT IN AN ORGANIZED HEALTH CARE EDUCATION/TRAINING PROGRAM

## 2024-11-26 PROCEDURE — 99284 EMERGENCY DEPT VISIT MOD MDM: CPT | Performed by: STUDENT IN AN ORGANIZED HEALTH CARE EDUCATION/TRAINING PROGRAM

## 2024-11-26 PROCEDURE — 85379 FIBRIN DEGRADATION QUANT: CPT | Performed by: STUDENT IN AN ORGANIZED HEALTH CARE EDUCATION/TRAINING PROGRAM

## 2024-11-26 PROCEDURE — 93005 ELECTROCARDIOGRAM TRACING: CPT | Performed by: STUDENT IN AN ORGANIZED HEALTH CARE EDUCATION/TRAINING PROGRAM

## 2024-11-26 PROCEDURE — 87081 CULTURE SCREEN ONLY: CPT | Performed by: NURSE PRACTITIONER

## 2024-11-26 PROCEDURE — 83880 ASSAY OF NATRIURETIC PEPTIDE: CPT | Performed by: STUDENT IN AN ORGANIZED HEALTH CARE EDUCATION/TRAINING PROGRAM

## 2024-11-26 PROCEDURE — 71045 X-RAY EXAM CHEST 1 VIEW: CPT

## 2024-11-26 PROCEDURE — 85007 BL SMEAR W/DIFF WBC COUNT: CPT | Performed by: STUDENT IN AN ORGANIZED HEALTH CARE EDUCATION/TRAINING PROGRAM

## 2024-11-26 PROCEDURE — 80053 COMPREHEN METABOLIC PANEL: CPT | Performed by: STUDENT IN AN ORGANIZED HEALTH CARE EDUCATION/TRAINING PROGRAM

## 2024-11-26 PROCEDURE — 87880 STREP A ASSAY W/OPTIC: CPT | Performed by: NURSE PRACTITIONER

## 2024-11-26 RX ORDER — SODIUM CHLORIDE 0.9 % (FLUSH) 0.9 %
10 SYRINGE (ML) INJECTION AS NEEDED
Status: DISCONTINUED | OUTPATIENT
Start: 2024-11-26 | End: 2024-11-27 | Stop reason: HOSPADM

## 2024-11-26 RX ORDER — POTASSIUM CHLORIDE 750 MG/1
20 CAPSULE, EXTENDED RELEASE ORAL ONCE
Status: COMPLETED | OUTPATIENT
Start: 2024-11-26 | End: 2024-11-26

## 2024-11-26 RX ADMIN — POTASSIUM CHLORIDE 20 MEQ: 750 CAPSULE, EXTENDED RELEASE ORAL at 21:37

## 2024-11-27 NOTE — ED PROVIDER NOTES
Pt Name: Alisha Quintana  MRN: [id number]  : 1965  Date of Encounter: 2024    PCP: Rocio Qureshi DO      Subjective    History of Present Illness:    Chief Complaint: Worsening shortness of breath, cough, nausea, dizziness    History of Present Illness: Alisha Quintana is a 59 y.o. female who presents to the ER complaining of worsening shortness of breath, cough, nausea, dizziness that started 5 days ago.  For the past month patient has experienced cough and congestion.  She states that she went to her family doctor on the  where they diagnosed her with bronchitis and gave her Augmentin.  She states that she has been taking her antibiotic as prescribed but has had worsening shortness of breath over the past few days with associated cough, productive sputum, fullness in her ears, nausea and dizziness.       Triage Vitals:    ED Triage Vitals [24]   Temp Heart Rate Resp BP SpO2   97.8 °F (36.6 °C) 120 20 114/78 97 %      Temp src Heart Rate Source Patient Position BP Location FiO2 (%)   Oral Monitor Sitting Left arm --       Nurses Notes reviewed and agree, including vitals, allergies, social history and prior medical history.     Patient has no known allergies.    Past Medical History:   Diagnosis Date    Diabetes mellitus     Headache     Hyperlipidemia     Hypertension     Renal insufficiency        Past Surgical History:   Procedure Laterality Date    COLONOSCOPY      HYSTERECTOMY  2017    OOPHORECTOMY         Social History     Socioeconomic History    Marital status:    Tobacco Use    Smoking status: Never    Smokeless tobacco: Never   Vaping Use    Vaping status: Never Used   Substance and Sexual Activity    Alcohol use: Yes     Alcohol/week: 1.0 standard drink of alcohol     Types: 1 Shots of liquor per week    Drug use: Never    Sexual activity: Yes     Partners: Male     Birth control/protection: Hysterectomy     Comment:        Family  History   Problem Relation Age of Onset    Cancer Father     Hyperlipidemia Father     Hyperlipidemia Sister     Cancer Brother     Breast cancer Neg Hx        REVIEW OF SYSTEMS:     All systems reviewed and not pertinent unless noted.    Review of Systems   HENT:  Positive for congestion and rhinorrhea.    Respiratory:  Positive for cough and shortness of breath.    All other systems reviewed and are negative.      Objective    Physical Exam  Vitals and nursing note reviewed.   Constitutional:       Appearance: Normal appearance.   HENT:      Head: Normocephalic and atraumatic.   Eyes:      Extraocular Movements: Extraocular movements intact.      Pupils: Pupils are equal, round, and reactive to light.   Cardiovascular:      Rate and Rhythm: Regular rhythm. Tachycardia present.      Pulses: Normal pulses.      Heart sounds: Normal heart sounds.   Pulmonary:      Effort: Pulmonary effort is normal.      Breath sounds: Normal breath sounds.   Abdominal:      General: Abdomen is flat.      Palpations: Abdomen is soft.   Musculoskeletal:      Cervical back: Normal range of motion and neck supple.   Skin:     General: Skin is warm and dry.      Capillary Refill: Capillary refill takes less than 2 seconds.   Neurological:      General: No focal deficit present.      Mental Status: She is alert and oriented to person, place, and time. Mental status is at baseline.      GCS: GCS eye subscore is 4. GCS verbal subscore is 5. GCS motor subscore is 6.      Sensory: Sensation is intact.      Motor: Motor function is intact.      Gait: Gait is intact.   Psychiatric:         Attention and Perception: Attention and perception normal.         Mood and Affect: Mood and affect normal.         Speech: Speech normal.         Behavior: Behavior normal. Behavior is cooperative.                           Procedures    ED Course:    ECG 12 Lead ED Triage Standing Order; SOA   Final Result          ED Course as of 11/26/24 9116   Tue Nov  26, 2024 1950 EKG interpreted by me, sinus tachycardia with no concerning ST changes noted, rate of 112 [JE]      ED Course User Index  [JE] Brandon Dubon MD       Orders placed during this visit:    Orders Placed This Encounter   Procedures    COVID PRE-OP / PRE-PROCEDURE SCREENING ORDER (NO ISOLATION) - Swab, Nasopharynx    Rapid Strep A Screen - Swab, Throat    COVID-19 and FLU A/B PCR, 1 HR TAT - Swab, Nasopharynx    Beta Strep Culture, Throat - Swab, Throat    XR Chest 1 View    [place] Draw    Comprehensive Metabolic Panel    BNP    Single High Sensitivity Troponin T    CBC Auto Differential    D-dimer, Quantitative    Manual Differential    NPO Diet NPO Type: Strict NPO    Undress & Gown    Continuous Pulse Oximetry    Vital Signs    Oxygen Therapy- Nasal Cannula; Titrate 1-6 LPM Per SpO2; 90 - 95%    ECG 12 Lead ED Triage Standing Order; SOA    Insert Peripheral IV    Insert Peripheral IV    CBC & Differential    Green Top (Gel)    Lavender Top    Gold Top - SST    Light Blue Top       LAB Results:    Lab Results (last 24 hours)       Procedure Component Value Units Date/Time    CBC & Differential [[id number]] Collected: 11/26/24 1931    Specimen: Blood Updated: 11/26/24 2002    Narrative:      The following orders were created for panel order CBC & Differential.  Procedure                               Abnormality         Status                     ---------                               -----------         ------                     CBC Auto Differential[[id number]]        Normal              Final result               Scan Slide[[id number]]                                                                    Please view results for these tests on the individual orders.    Comprehensive Metabolic Panel [[id number]]  (Abnormal) Collected: 11/26/24 1931    Specimen: Blood Updated: 11/26/24 1952     Glucose 113 mg/dL      BUN 20 mg/dL      Creatinine 1.48 mg/dL      Sodium 133 mmol/L      Potassium 3.1  mmol/L      Chloride 88 mmol/L      CO2 29.6 mmol/L      Calcium 9.2 mg/dL      Total Protein 7.5 g/dL      Albumin 4.4 g/dL      ALT (SGPT) 18 U/L      AST (SGOT) 18 U/L      Alkaline Phosphatase 72 U/L      Total Bilirubin 0.6 mg/dL      Globulin 3.1 gm/dL      A/G Ratio 1.4 g/dL      BUN/Creatinine Ratio 13.5     Anion Gap 15.4 mmol/L      eGFR 41.1 mL/min/1.73     Narrative:      GFR Normal >60  Chronic Kidney Disease <60  Kidney Failure <15      BNP [[id number]]  (Normal) Collected: 11/26/24 1931    Specimen: Blood Updated: 11/26/24 1955     proBNP 38.2 pg/mL     Narrative:      This assay is used as an aid in the diagnosis of individuals suspected of having heart failure. It can be used as an aid in the diagnosis of acute decompensated heart failure (ADHF) in patients presenting with signs and symptoms of ADHF to the emergency department (ED). In addition, NT-proBNP of <300 pg/mL indicates ADHF is not likely.    Age Range Result Interpretation  NT-proBNP Concentration (pg/mL:      <50             Positive            >450                   Gray                 300-450                    Negative             <300    50-75           Positive            >900                  Gray                300-900                  Negative            <300      >75             Positive            >1800                  Gray                300-1800                  Negative            <300    Single High Sensitivity Troponin T [[id number]]  (Normal) Collected: 11/26/24 1931    Specimen: Blood Updated: 11/26/24 1955     HS Troponin T <6 ng/L     Narrative:      High Sensitive Troponin T Reference Range:  <14.0 ng/L- Negative Female for AMI  <22.0 ng/L- Negative Male for AMI  >=14 - Abnormal Female indicating possible myocardial injury.  >=22 - Abnormal Male indicating possible myocardial injury.   Clinicians would have to utilize clinical acumen, EKG, Troponin, and serial changes to determine if it is an Acute Myocardial  "Infarction or myocardial injury due to an underlying chronic condition.         CBC Auto Differential [[id number]]  (Normal) Collected: 11/26/24 1931    Specimen: Blood Updated: 11/26/24 1957     WBC 7.34 10*3/mm3      RBC 4.45 10*6/mm3      Hemoglobin 13.3 g/dL      Hematocrit 38.4 %      MCV 86.3 fL      MCH 29.9 pg      MCHC 34.6 g/dL      RDW 12.5 %      RDW-SD 39.6 fl      MPV 9.4 fL      Platelets 280 10*3/mm3     D-dimer, Quantitative [[id number]]  (Normal) Collected: 11/26/24 1931    Specimen: Blood Updated: 11/26/24 2004     D-Dimer, Quantitative <0.27 MCGFEU/mL     Narrative:      According to the assay 's published package insert, a normal (<0.50 MCGFEU/mL) D-dimer result in conjunction with a non-high clinical probability assessment, excludes deep vein thrombosis (DVT) and pulmonary embolism (PE) with high sensitivity.    D-dimer values increase with age and this can make VTE exclusion of an older population difficult. To address this, the [employer], based on best available evidence and recent guidelines, recommends that clinicians use age-adjusted D-dimer thresholds in patients greater than 50 years of age with: a) a low probability of PE who do not meet all Pulmonary Embolism Rule Out Criteria, or b) in those with intermediate probability of PE.   The formula for an age-adjusted D-dimer cut-off is \"age/100\".  For example, a 60 year old patient would have an age-adjusted cut-off of 0.60 MCGFEU/mL and an 80 year old 0.80 MCGFEU/mL.    Manual Differential [[id number]]  (Abnormal) Collected: 11/26/24 1931    Specimen: Blood Updated: 11/26/24 2002     Neutrophil % 57.0 %      Lymphocyte % 29.0 %      Monocyte % 6.0 %      Basophil % 1.0 %      Bands %  1.0 %      Atypical Lymphocyte % 5.0 %      Other Cells % 1.0 %      Neutrophils Absolute 4.26 10*3/mm3      Lymphocytes Absolute 2.50 10*3/mm3      Monocytes Absolute 0.44 10*3/mm3      Basophils Absolute 0.07 10*3/mm3      RBC " Morphology Normal     WBC Morphology Normal     Platelet Morphology Normal    PERIPHERAL SMEAR, [facility] [[id number]] Collected: 11/26/24 1931    Specimen: Blood Updated: 11/26/24 2002    COVID PRE-OP / PRE-PROCEDURE SCREENING ORDER (NO ISOLATION) - Swab, Nasopharynx [[id number]]  (Normal) Collected: 11/26/24 1941    Specimen: Swab from Nasopharynx Updated: 11/26/24 2005    Narrative:      The following orders were created for panel order COVID PRE-OP / PRE-PROCEDURE SCREENING ORDER (NO ISOLATION) - Swab, Nasopharynx.  Procedure                               Abnormality         Status                     ---------                               -----------         ------                     COVID-19 and FLU A/B PCR...[[id number]]  Normal              Final result                 Please view results for these tests on the individual orders.    Rapid Strep A Screen - Swab, Throat [[id number]]  (Normal) Collected: 11/26/24 1941    Specimen: Swab from Throat Updated: 11/26/24 1952     Strep A Ag Negative    COVID-19 and FLU A/B PCR, 1 HR TAT - Swab, Nasopharynx [[id number]]  (Normal) Collected: 11/26/24 1941    Specimen: Swab from Nasopharynx Updated: 11/26/24 2005     COVID19 Not Detected     Influenza A PCR Not Detected     Influenza B PCR Not Detected    Narrative:      Fact sheet for providers: https://www.fda.gov/media/078893/download    Fact sheet for patients: https://www.fda.gov/media/628672/download    Test performed by PCR.    Beta Strep Culture, Throat - Swab, Throat [[id number]] Collected: 11/26/24 1941    Specimen: Swab from Throat Updated: 11/26/24 1952             If labs were ordered, I have independently reviewed the results and considered them in the diagnosis and treatment plan for the patient    RADIOLOGY    No radiology results from the last 24 hrs     If I have ordered, I have independently reviewed the above noted radiographic studies.  Please see the radiologist dictation for the official  interpretation    Medications given to patient in the ER    Medications   sodium chloride 0.9 % flush 10 mL (has no administration in time range)   sodium chloride 0.9 % flush 10 mL (has no administration in time range)   potassium chloride (MICRO-K/KLOR-CON) CR capsule (20 mEq Oral Given 11/26/24 2137)       AS OF 23:36 EST VITALS:    BP - 140/74  HR - 102  TEMP - 97.8 °F (36.6 °C) (Oral)  O2 SATS - 97%         Shared Decision Making: After my consideration of the clinical presentation and laboratory/radiology studies obtained, I have discussed the findings with the patient/patient representative who is in agreement with the treatment plan and final disposition. Risks and benefits of discharge and/or observation admission were discussed.  Final disposition of the patient will be discharged home.  Patient is requested to follow-up with primary care provider and specialist in 1 week following final discharge.      Medical Decision Making  Alisha Quintana is a 59 y.o. female who presents to the ER complaining of worsening shortness of breath, cough, nausea, dizziness that started 5 days ago.  For the past month patient has experienced cough and congestion.  She states that she went to her family doctor on the 22nd where they diagnosed her with bronchitis and gave her Augmentin.  She states that she has been taking her antibiotic as prescribed but has had worsening shortness of breath over the past few days with associated cough, productive sputum, fullness in her ears, nausea and dizziness.     DDX: includes but is not limited to: COVID-19, influenza, viral respiratory illness, other    Problems Addressed:  Viral illness: complicated acute illness or injury    Amount and/or Complexity of Data Reviewed  Labs: ordered. Decision-making details documented in ED Course.     Details: And cerumen  Radiology: ordered.     Details: I have personally reviewed and documented all results  ECG/medicine tests:  ordered.  Discussion of management or test interpretation with external provider(s): Discussed assessment, treatment and plan with ER attending    Risk  Prescription drug management.  Risk Details: I have discussed with patient the finding of the test preformed today. Patient has been diagnosed with viral illness and will be discharged home. Advised on return precautions the importance of close follow-up with primary care provider.  Strict return precautions have been given and patient verbalizes understanding        Final diagnoses:   Viral illness       Please note that portions of this document were completed using voice recognition dictation software.       Sam Crisostomo, APRN  11/26/24 6144

## 2024-11-29 LAB
BACTERIA SPEC AEROBE CULT: NORMAL
REF LAB TEST METHOD: NORMAL

## 2024-12-05 ENCOUNTER — OFFICE VISIT (OUTPATIENT)
Dept: FAMILY MEDICINE CLINIC | Facility: CLINIC | Age: 59
End: 2024-12-05
Payer: COMMERCIAL

## 2024-12-05 VITALS
HEART RATE: 80 BPM | OXYGEN SATURATION: 96 % | HEIGHT: 67 IN | WEIGHT: 157.6 LBS | BODY MASS INDEX: 24.74 KG/M2 | DIASTOLIC BLOOD PRESSURE: 76 MMHG | SYSTOLIC BLOOD PRESSURE: 124 MMHG

## 2024-12-05 DIAGNOSIS — H60.501 ACUTE OTITIS EXTERNA OF RIGHT EAR, UNSPECIFIED TYPE: ICD-10-CM

## 2024-12-05 DIAGNOSIS — J06.9 UPPER RESPIRATORY TRACT INFECTION, UNSPECIFIED TYPE: Primary | ICD-10-CM

## 2024-12-05 DIAGNOSIS — N17.9 AKI (ACUTE KIDNEY INJURY): ICD-10-CM

## 2024-12-05 DIAGNOSIS — E87.6 HYPOKALEMIA: ICD-10-CM

## 2024-12-05 PROCEDURE — 99214 OFFICE O/P EST MOD 30 MIN: CPT | Performed by: STUDENT IN AN ORGANIZED HEALTH CARE EDUCATION/TRAINING PROGRAM

## 2024-12-05 RX ORDER — FLUTICASONE PROPIONATE 50 MCG
1 SPRAY, SUSPENSION (ML) NASAL 2 TIMES DAILY
Qty: 18.2 G | Refills: 2 | Status: SHIPPED | OUTPATIENT
Start: 2024-12-05

## 2024-12-05 RX ORDER — CIPROFLOXACIN AND DEXAMETHASONE 3; 1 MG/ML; MG/ML
4 SUSPENSION/ DROPS AURICULAR (OTIC) 2 TIMES DAILY
Qty: 7.5 ML | Refills: 0 | Status: SHIPPED | OUTPATIENT
Start: 2024-12-05 | End: 2024-12-12

## 2024-12-05 RX ORDER — AZITHROMYCIN 250 MG/1
TABLET, FILM COATED ORAL
Qty: 6 TABLET | Refills: 0 | Status: SHIPPED | OUTPATIENT
Start: 2024-12-05

## 2024-12-05 NOTE — PROGRESS NOTES
Established Office Visit      Patient Name: Alisha Quintana  : 1965   MRN: [id number]   Care Team: Patient Care Team:  Rocio Qureshi DO as PCP - General (Internal Medicine)    Chief Complaint:    Chief Complaint   Patient presents with    Cough     X1 month     Earache     Right side, pop noise can't hear out of it        History of Present Illness: Alisha Quintana is a 59 y.o. female HTN, HLD, T2DM, CKD3a, insomnia  who is here today to follow up with URI symptoms.    Patient reports over the past 4-5 weeks she has had URI symptoms.  She was seen on 24 and diagnosed with bronchitis and provided course of augmentin and bromfed DM. She reports no improvement with this. She we went to ER on 24 due to worsening symptoms. CXR was unrevealing.   She reports increase in congestion, productive cough throughout the day and night. Mild amount of body aches. Symptoms only seem to be worsening. No SOA or fever.    When she was in the ER she had labs which revealed ANAYA Cr 1.4 and hypokalemia 3.1.  she has been hydrating more since then but not able to eat much due to lack of appetite.     Right ear is particularly painful.     Subjective      Review of Systems:   Review of Systems - See HPI    I have reviewed and the following portions of the patient's history were updated as appropriate: past family history, past medical history, past social history, past surgical history and problem list.    Medications:     Current Outpatient Medications:     atorvastatin (LIPITOR) 80 MG tablet, Take 1 tablet by mouth Daily., Disp: 90 tablet, Rfl: 3    ezetimibe (ZETIA) 10 MG tablet, Take 1 tablet by mouth Daily., Disp: 90 tablet, Rfl: 0    levothyroxine (SYNTHROID, LEVOTHROID) 88 MCG tablet, Take 1 tablet by mouth Daily., Disp: 30 tablet, Rfl: 5    lisinopril-hydrochlorothiazide (PRINZIDE,ZESTORETIC) 20-12.5 MG per tablet, Take 2 tablets by mouth Daily., Disp: 60 tablet, Rfl: 2    metFORMIN ER  "(GLUCOPHAGE-XR) 500 MG 24 hr tablet, Take 2 tablets by mouth Every 12 (Twelve) Hours., Disp: , Rfl:     ondansetron ODT (ZOFRAN-ODT) 4 MG disintegrating tablet, Place 1 tablet on the tongue Every 8 (Eight) Hours As Needed for Nausea or Vomiting (FOR COLONOSCOPY PREP)., Disp: 6 tablet, Rfl: 0    Semaglutide, 2 MG/DOSE, (Ozempic, 2 MG/DOSE,) 8 MG/3ML solution pen-injector, Inject 2 mg under the skin into the appropriate area as directed 1 (One) Time Per Week., Disp: 9 mL, Rfl: 1    traZODone (DESYREL) 100 MG tablet, Take 1 tablet by mouth Every Night., Disp: 30 tablet, Rfl: 5    azithromycin (Zithromax Z-Ovidio) 250 MG tablet, Take 2 tablets by mouth on day 1, then 1 tablet daily on days 2-5, Disp: 6 tablet, Rfl: 0    ciprofloxacin-dexAMETHasone (Ciprodex) 0.3-0.1 % otic suspension, Administer 4 drops to the right ear 2 (Two) Times a Day for 7 days., Disp: 7.5 mL, Rfl: 0    fluticasone (FLONASE) 50 MCG/ACT nasal spray, Administer 1 spray into the nostril(s) as directed by provider 2 (Two) Times a Day., Disp: 18.2 g, Rfl: 2    Allergies:   No Known Allergies    Objective     Physical Exam:  Vital Signs:   Vitals:    12/05/24 1029   BP: 124/76   Pulse: 80   SpO2: 96%   Weight: 71.5 kg (157 lb 9.6 oz)   Height: 170.2 cm (67\")     Body mass index is 24.68 kg/m².     Physical Exam  Vitals reviewed.   Constitutional:       Appearance: Normal appearance. She is ill-appearing.   HENT:      Ears:      Comments: Right TM intact  Canal is erythematous, tender with serous discharge  Left ear normal TM and canal     Nose: Congestion and rhinorrhea present.   Cardiovascular:      Rate and Rhythm: Normal rate.      Heart sounds: Normal heart sounds.   Pulmonary:      Effort: Pulmonary effort is normal. No respiratory distress.      Breath sounds: Normal breath sounds. No stridor. No wheezing, rhonchi or rales.   Neurological:      Mental Status: She is alert.   Psychiatric:         Mood and Affect: Mood normal.         Behavior: " Behavior normal.         Judgment: Judgment normal.         Assessment / Plan      Assessment/Plan:   Problems Addressed This Visit  Diagnoses and all orders for this visit:    1. Upper respiratory tract infection, unspecified type (Primary)  -     fluticasone (FLONASE) 50 MCG/ACT nasal spray; Administer 1 spray into the nostril(s) as directed by provider 2 (Two) Times a Day.  Dispense: 18.2 g; Refill: 2  -     azithromycin (Zithromax Z-Ovidio) 250 MG tablet; Take 2 tablets by mouth on day 1, then 1 tablet daily on days 2-5  Dispense: 6 tablet; Refill: 0    2. ANAYA (acute kidney injury)  -     Basic metabolic panel; Future    3. Hypokalemia  -     Basic metabolic panel; Future    4. Acute otitis externa of right ear, unspecified type  -     azithromycin (Zithromax Z-Ovidio) 250 MG tablet; Take 2 tablets by mouth on day 1, then 1 tablet daily on days 2-5  Dispense: 6 tablet; Refill: 0  -     ciprofloxacin-dexAMETHasone (Ciprodex) 0.3-0.1 % otic suspension; Administer 4 drops to the right ear 2 (Two) Times a Day for 7 days.  Dispense: 7.5 mL; Refill: 0      Patient presents with URI symptoms which have been progressively worsening over the past 4-5 weeks. She has completed treatment with Augmentin without much improvement. Discussed covering with Azithromycin for atypicals and she would like to do this. Will also start cipro dex for otitis externa of right ear. Recommend flonase to help with nasal congestion and PND. Bromfed DM syrup was not helpful.    Reviewed ER visit 11/26 - covid, flu, strep negative. CXR unrevealing, labs concerning for ANAYA and hypokalemia. Stressed importance of hydration with electrolyte rich fluid as she is not eating much while feeling ill. She will have labs repeated.     If symptoms persist - may benefit from viral PCR swab but due to financial burden, will only proceed with this if no improvement with todays treatment.     Plan of care reviewed with patient at the conclusion of today's visit.  Education was provided regarding diagnosis and management.  Patient verbalizes understanding of and agreement with management plan.    Follow Up:   Return if symptoms worsen or fail to improve.        DO DEEP Lyons RD  [facility]  [address]  [facility] [address]  Fax [phone]  Phone [phone]

## 2024-12-06 ENCOUNTER — LAB (OUTPATIENT)
Dept: LAB | Facility: HOSPITAL | Age: 59
End: 2024-12-06
Payer: COMMERCIAL

## 2024-12-06 DIAGNOSIS — N17.9 AKI (ACUTE KIDNEY INJURY): ICD-10-CM

## 2024-12-06 DIAGNOSIS — E87.6 HYPOKALEMIA: ICD-10-CM

## 2024-12-06 LAB
ANION GAP SERPL CALCULATED.3IONS-SCNC: 14.1 MMOL/L (ref 5–15)
BUN SERPL-MCNC: 7 MG/DL (ref 6–20)
BUN/CREAT SERPL: 6.1 (ref 7–25)
CALCIUM SPEC-SCNC: 9.5 MG/DL (ref 8.6–10.5)
CHLORIDE SERPL-SCNC: 90 MMOL/L (ref 98–107)
CO2 SERPL-SCNC: 26.9 MMOL/L (ref 22–29)
CREAT SERPL-MCNC: 1.14 MG/DL (ref 0.57–1)
EGFRCR SERPLBLD CKD-EPI 2021: 56.3 ML/MIN/1.73
GLUCOSE SERPL-MCNC: 166 MG/DL (ref 65–99)
POTASSIUM SERPL-SCNC: 2.9 MMOL/L (ref 3.5–5.2)
SODIUM SERPL-SCNC: 131 MMOL/L (ref 136–145)

## 2024-12-06 PROCEDURE — 80048 BASIC METABOLIC PNL TOTAL CA: CPT

## 2024-12-09 ENCOUNTER — TELEPHONE (OUTPATIENT)
Dept: FAMILY MEDICINE CLINIC | Facility: CLINIC | Age: 59
End: 2024-12-09
Payer: COMMERCIAL

## 2024-12-09 NOTE — TELEPHONE ENCOUNTER
Caller: Alisha Quintana    Relationship: Self    Best call back number: [phone]     What is the medical concern/diagnosis: KIDNEY FUNCTION    What specialty or service is being requested: NEPHROLOGY    What is the provider, practice or medical service name: [facility]      What is the office location: [address] [place] [address] [address]. CHANEL RAMSAY [id number]      What is the office phone number:[phone] FAX NUMBER : [phone]    Any additional details: PLEASE CALL PATIENT ONCE THE REFERRAL REQUEST HAS BEEN SEND AND APPROVED.

## 2024-12-10 DIAGNOSIS — E87.6 HYPOKALEMIA: Primary | ICD-10-CM

## 2024-12-10 DIAGNOSIS — N18.31 STAGE 3A CHRONIC KIDNEY DISEASE: ICD-10-CM

## 2024-12-10 DIAGNOSIS — I10 PRIMARY HYPERTENSION: ICD-10-CM

## 2024-12-10 RX ORDER — AMLODIPINE BESYLATE 5 MG/1
5 TABLET ORAL DAILY
Qty: 30 TABLET | Refills: 2 | Status: SHIPPED | OUTPATIENT
Start: 2024-12-10

## 2024-12-10 RX ORDER — POTASSIUM CHLORIDE 1500 MG/1
20 TABLET, EXTENDED RELEASE ORAL 3 TIMES DAILY
Qty: 15 TABLET | Refills: 0 | Status: SHIPPED | OUTPATIENT
Start: 2024-12-10

## 2024-12-10 RX ORDER — LISINOPRIL 40 MG/1
40 TABLET ORAL DAILY
Qty: 30 TABLET | Refills: 2 | Status: SHIPPED | OUTPATIENT
Start: 2024-12-10

## 2024-12-10 NOTE — TELEPHONE ENCOUNTER
Please let patient know her kidney function has improved back to her baseline but still a little lower than the average person. I recommend referral to nephrology (kidney specialist). I received your message and will send this referral to your preferred provider.  Your potassium is low. It is important that we replace this. I have sent in a supplement to take three times daily for the next 5 days. I also recommend stopping lisinopril-HCTZ because the HCTZ in this medication can deplete your potassium and sodium. I have sent in a new prescription for Lisinopril 40mg daily so that you can continue this and in place of the HCTZ I have sent in Amlodipine 5mg daily. We will need you to stop by the lab early next week to make sure potassium has appropriately normalized.     In addition to this, since we are having to change your blood pressure medication I suggest keeping an eye on the blood pressure from home. Let me know if it is persistently >130/80.

## 2024-12-10 NOTE — TELEPHONE ENCOUNTER
Patient informed of labs and recommendations. I informed her we would be working on her referral. She mentioned that she would not take the amlodipine that her last kidney physician told her to never take this that it was horrible for her kidneys. She will continue with the Lisinopril and the Potassium supplement. She will return next week for labs

## 2025-01-02 ENCOUNTER — TELEPHONE (OUTPATIENT)
Dept: FAMILY MEDICINE CLINIC | Facility: CLINIC | Age: 60
End: 2025-01-02
Payer: COMMERCIAL

## 2025-01-02 NOTE — TELEPHONE ENCOUNTER
Pt's BP is high today 149/110 at about 12:50pm resting. She feels that BP meds were cut in half at last OV and she thinks that may have been done in error, she would like Dr Qureshi to review & send in new Rx.

## 2025-01-02 NOTE — TELEPHONE ENCOUNTER
Which blood pressure medications is she taking? Should be taking lisinopril 40mg and amlodipine 5mg daily    We discontinued lisinopril-HCTZ because the HCTZ portion was causing low sodium, low potassium, and dehydrating. See results note 12/10/24. She is overdue to have labs repeated FYI.

## 2025-01-03 ENCOUNTER — PATIENT MESSAGE (OUTPATIENT)
Dept: FAMILY MEDICINE CLINIC | Facility: CLINIC | Age: 60
End: 2025-01-03
Payer: COMMERCIAL

## 2025-01-15 ENCOUNTER — OFFICE VISIT (OUTPATIENT)
Dept: FAMILY MEDICINE CLINIC | Facility: CLINIC | Age: 60
End: 2025-01-15
Payer: COMMERCIAL

## 2025-01-15 ENCOUNTER — LAB (OUTPATIENT)
Dept: LAB | Facility: HOSPITAL | Age: 60
End: 2025-01-15
Payer: COMMERCIAL

## 2025-01-15 VITALS
SYSTOLIC BLOOD PRESSURE: 126 MMHG | BODY MASS INDEX: 24.64 KG/M2 | DIASTOLIC BLOOD PRESSURE: 84 MMHG | HEIGHT: 67 IN | WEIGHT: 157 LBS

## 2025-01-15 DIAGNOSIS — Z23 IMMUNIZATION DUE: ICD-10-CM

## 2025-01-15 DIAGNOSIS — E11.9 TYPE 2 DIABETES MELLITUS WITHOUT COMPLICATION, WITHOUT LONG-TERM CURRENT USE OF INSULIN: Primary | ICD-10-CM

## 2025-01-15 DIAGNOSIS — N18.31 STAGE 3A CHRONIC KIDNEY DISEASE: ICD-10-CM

## 2025-01-15 DIAGNOSIS — E87.6 HYPOKALEMIA: ICD-10-CM

## 2025-01-15 DIAGNOSIS — I10 PRIMARY HYPERTENSION: ICD-10-CM

## 2025-01-15 LAB
ANION GAP SERPL CALCULATED.3IONS-SCNC: 14 MMOL/L (ref 5–15)
BUN SERPL-MCNC: 11 MG/DL (ref 6–20)
BUN/CREAT SERPL: 10.1 (ref 7–25)
CALCIUM SPEC-SCNC: 9.6 MG/DL (ref 8.6–10.5)
CHLORIDE SERPL-SCNC: 97 MMOL/L (ref 98–107)
CO2 SERPL-SCNC: 26 MMOL/L (ref 22–29)
CREAT SERPL-MCNC: 1.09 MG/DL (ref 0.57–1)
EGFRCR SERPLBLD CKD-EPI 2021: 59.4 ML/MIN/1.73
EXPIRATION DATE: NORMAL
GLUCOSE SERPL-MCNC: 99 MG/DL (ref 65–99)
HBA1C MFR BLD: 5.7 % (ref 4.5–5.7)
Lab: NORMAL
POTASSIUM SERPL-SCNC: 3.8 MMOL/L (ref 3.5–5.2)
SODIUM SERPL-SCNC: 137 MMOL/L (ref 136–145)

## 2025-01-15 PROCEDURE — 90656 IIV3 VACC NO PRSV 0.5 ML IM: CPT | Performed by: STUDENT IN AN ORGANIZED HEALTH CARE EDUCATION/TRAINING PROGRAM

## 2025-01-15 PROCEDURE — 80048 BASIC METABOLIC PNL TOTAL CA: CPT

## 2025-01-15 PROCEDURE — 90471 IMMUNIZATION ADMIN: CPT | Performed by: STUDENT IN AN ORGANIZED HEALTH CARE EDUCATION/TRAINING PROGRAM

## 2025-01-15 PROCEDURE — 99214 OFFICE O/P EST MOD 30 MIN: CPT | Performed by: STUDENT IN AN ORGANIZED HEALTH CARE EDUCATION/TRAINING PROGRAM

## 2025-01-15 PROCEDURE — 83036 HEMOGLOBIN GLYCOSYLATED A1C: CPT | Performed by: STUDENT IN AN ORGANIZED HEALTH CARE EDUCATION/TRAINING PROGRAM

## 2025-01-15 RX ORDER — LISINOPRIL AND HYDROCHLOROTHIAZIDE 12.5; 2 MG/1; MG/1
2 TABLET ORAL DAILY
COMMUNITY

## 2025-01-15 NOTE — PROGRESS NOTES
Established Office Visit      Patient Name: Alisha Quintana  : 1965   MRN: [id number]   Care Team: Patient Care Team:  Rocio Qureshi DO as PCP - General (Internal Medicine)    Chief Complaint:    Chief Complaint   Patient presents with    Diabetes       History of Present Illness: Alisha Quintana is a 59 y.o. female with HTN, HLD, T2DM, CKD3a, insomnia who is here today to follow up with chronic medical conditions.     HTN - she has been taking lisinopril 40mg HCTZ 25mg daily. She had labs showing stable CKD and hypokalemia so I had suggested d/c her HCTZ, instead recommended starting amlodipine. States she was told by her nephrologist in [place] in the past that she was not allowed to take amlodipine and so she did not start this. She has been taking her lisinopril HCTZ regularly over the past 2 weeks and BP has been well controlled. She will have labs completed today.   She was referred to Nephrology Dr. Garcia in [place] on 12/10/24 and has not yet heard from this to schedule an appointment. She is understandably frustrated with this as she is overdue for her follow up .    T2DM - compliant with ozempic and metformin. Tolerating this well. No side effects.   Weight has been stable over the past several months.       Subjective      Review of Systems:   Review of Systems - See HPI    I have reviewed and the following portions of the patient's history were updated as appropriate: past family history, past medical history, past social history, past surgical history and problem list.    Medications:     Current Outpatient Medications:     atorvastatin (LIPITOR) 80 MG tablet, Take 1 tablet by mouth Daily., Disp: 90 tablet, Rfl: 3    ezetimibe (ZETIA) 10 MG tablet, Take 1 tablet by mouth Daily., Disp: 90 tablet, Rfl: 0    fluticasone (FLONASE) 50 MCG/ACT nasal spray, Administer 1 spray into the nostril(s) as directed by provider 2 (Two) Times a Day., Disp: 18.2 g, Rfl: 2    levothyroxine  "(SYNTHROID, LEVOTHROID) 88 MCG tablet, Take 1 tablet by mouth Daily., Disp: 30 tablet, Rfl: 5    metFORMIN ER (GLUCOPHAGE-XR) 500 MG 24 hr tablet, Take 2 tablets by mouth Every 12 (Twelve) Hours., Disp: , Rfl:     ondansetron ODT (ZOFRAN-ODT) 4 MG disintegrating tablet, Place 1 tablet on the tongue Every 8 (Eight) Hours As Needed for Nausea or Vomiting (FOR COLONOSCOPY PREP)., Disp: 6 tablet, Rfl: 0    potassium chloride (KLOR-CON M20) 20 MEQ CR tablet, Take 1 tablet by mouth 3 (Three) Times a Day., Disp: 15 tablet, Rfl: 0    Semaglutide, 2 MG/DOSE, (Ozempic, 2 MG/DOSE,) 8 MG/3ML solution pen-injector, Inject 2 mg under the skin into the appropriate area as directed 1 (One) Time Per Week., Disp: 9 mL, Rfl: 1    traZODone (DESYREL) 100 MG tablet, Take 1 tablet by mouth Every Night., Disp: 30 tablet, Rfl: 5    lisinopril-hydrochlorothiazide (PRINZIDE,ZESTORETIC) 20-12.5 MG per tablet, Take 2 tablets by mouth Daily., Disp: , Rfl:     Allergies:   No Known Allergies    Objective     Physical Exam:  Vital Signs:   Vitals:    01/15/25 0905   BP: 126/84   Weight: 71.2 kg (157 lb)   Height: 170.2 cm (67\")     Body mass index is 24.59 kg/m².     Physical Exam  Vitals reviewed.   Constitutional:       Appearance: Normal appearance. She is not ill-appearing.   Pulmonary:      Effort: Pulmonary effort is normal. No respiratory distress.   Skin:     General: Skin is warm and dry.   Neurological:      Mental Status: She is alert.   Psychiatric:         Mood and Affect: Mood normal.         Behavior: Behavior normal.         Judgment: Judgment normal.         Assessment / Plan      Assessment/Plan:   Problems Addressed This Visit  Diagnoses and all orders for this visit:    1. Type 2 diabetes mellitus without complication, without long-term current use of insulin (Primary)  -     POC Glycosylated Hemoglobin (Hb A1C)    2. Hypokalemia    3. Immunization due  -     Fluzone >6mos ([id number])    4. Stage 3a chronic kidney " disease    5. Primary hypertension      T2DM - A1c 5.7% very well controlled   Continue statin and ACEi - will be due for lipid panel and urine micro at annual next visit   Continue metformin 1000mg BID and Ozempic - could consider cutting back on metformin if necessary from renal standpoint pending labs today     HLD - continue atorvastatin, zetia - lipid panel next visit     CKD3a - again referred to nephrology - Dr. Garcia per patient preference. BMP today. Discussed side effects of HCTZ as a diuretic causing volume depletion and hypokalemia - concerned this may not be the best choice for her however her BP remains well controlled with this and she elects to stay on this until meeting with nephrologist. States she was advised not to take Amlodipine in the past.     HTN - well controlled - continue lisinopril HCTZ as detailed above. Would like to consider alternative to HCTZ if potassium remains low.     Plan of care reviewed with patient at the conclusion of today's visit. Education was provided regarding diagnosis and management.  Patient verbalizes understanding of and agreement with management plan.    Follow Up:   Return in about 4 months (around 5/15/2025) for Annual physical.        DO [facility] [address]  [facility]  [address]  [facility] [phone]  Fax [phone]  Phone [phone]

## 2025-02-14 DIAGNOSIS — E11.9 TYPE 2 DIABETES MELLITUS WITHOUT COMPLICATION, WITHOUT LONG-TERM CURRENT USE OF INSULIN: ICD-10-CM

## 2025-02-14 DIAGNOSIS — E78.2 MIXED HYPERLIPIDEMIA: ICD-10-CM

## 2025-02-14 NOTE — TELEPHONE ENCOUNTER
Caller: Alisha Quintana Manuel    Relationship: Self    Best call back number: [phone]     Requested Prescriptions:   Requested Prescriptions     Pending Prescriptions Disp Refills    lisinopril-hydrochlorothiazide (PRINZIDE,ZESTORETIC) 20-12.5 MG per tablet [Pharmacy Med Name: LISINOPRIL-HCTZ 20-12.5 MG TAB] 180 tablet      Sig: TAKE 2 TABLETS BY MOUTH DAILY    ezetimibe (ZETIA) 10 MG tablet 90 tablet 0     Sig: Take 1 tablet by mouth Daily.    metFORMIN ER (GLUCOPHAGE-XR) 500 MG 24 hr tablet       Sig: Take 2 tablets by mouth Every 12 (Twelve) Hours.    Semaglutide, 2 MG/DOSE, (Ozempic, 2 MG/DOSE,) 8 MG/3ML solution pen-injector 9 mL 1     Sig: Inject 2 mg under the skin into the appropriate area as directed 1 (One) Time Per Week.        Pharmacy where request should be sent: [facility] [id number] [address] [phone] [facility] [phone] FX     Last office visit with prescribing clinician: 1/15/2025   Last telemedicine visit with prescribing clinician: 9/10/2024   Next office visit with prescribing clinician: Visit date not found     Additional details provided by patient: PATIENT WILL RUN OUT OVER THE WEEKEND, PLEASE SEND A 90 DAY SUPPLY WITH REFILLS    Does the patient have less than a 3 day supply:  [x] Yes  [] No    Would you like a call back once the refill request has been completed: [] Yes [x] No    If the office needs to give you a call back, can they leave a voicemail: [x] Yes [] No    Alonzo Hall Rep   02/14/25 15:58 EST

## 2025-02-17 RX ORDER — EZETIMIBE 10 MG/1
10 TABLET ORAL DAILY
Qty: 90 TABLET | Refills: 1 | Status: SHIPPED | OUTPATIENT
Start: 2025-02-17

## 2025-02-17 RX ORDER — SEMAGLUTIDE 2.68 MG/ML
2 INJECTION, SOLUTION SUBCUTANEOUS WEEKLY
Qty: 9 ML | Refills: 1 | Status: SHIPPED | OUTPATIENT
Start: 2025-02-17

## 2025-02-17 RX ORDER — LISINOPRIL AND HYDROCHLOROTHIAZIDE 12.5; 2 MG/1; MG/1
2 TABLET ORAL DAILY
Qty: 180 TABLET | Refills: 0 | Status: SHIPPED | OUTPATIENT
Start: 2025-02-17

## 2025-02-17 RX ORDER — METFORMIN HYDROCHLORIDE 500 MG/1
1000 TABLET, EXTENDED RELEASE ORAL EVERY 12 HOURS SCHEDULED
Qty: 120 TABLET | Refills: 5 | Status: SHIPPED | OUTPATIENT
Start: 2025-02-17

## 2025-02-17 NOTE — TELEPHONE ENCOUNTER
Rx Refill Note  Requested Prescriptions     Pending Prescriptions Disp Refills    lisinopril-hydrochlorothiazide (PRINZIDE,ZESTORETIC) 20-12.5 MG per tablet [Pharmacy Med Name: LISINOPRIL-HCTZ 20-12.5 MG TAB] 180 tablet      Sig: TAKE 2 TABLETS BY MOUTH DAILY    metFORMIN ER (GLUCOPHAGE-XR) 500 MG 24 hr tablet       Sig: Take 2 tablets by mouth Every 12 (Twelve) Hours.     Signed Prescriptions Disp Refills    ezetimibe (ZETIA) 10 MG tablet 90 tablet 1     Sig: Take 1 tablet by mouth Daily.     Authorizing Provider: TERELL LOZANO     Ordering User: GIVOANA JORDAN    Semaglutide, 2 MG/DOSE, (Ozempic, 2 MG/DOSE,) 8 MG/3ML solution pen-injector 9 mL 1     Sig: Inject 2 mg under the skin into the appropriate area as directed 1 (One) Time Per Week.     Authorizing Provider: TERELL LOZANO     Ordering User: GIOVANA JORDAN      Last office visit with prescribing clinician: 1/15/2025   Last telemedicine visit with prescribing clinician: 9/10/2024   Next office visit with prescribing clinician: Visit date not found                         Would you like a call back once the refill request has been completed: [] Yes [] No    If the office needs to give you a call back, can they leave a voicemail: [] Yes [] No    Giovana Jordan MA  02/17/25, 07:54 EST

## 2025-02-20 ENCOUNTER — TRANSCRIBE ORDERS (OUTPATIENT)
Dept: ADMINISTRATIVE | Facility: HOSPITAL | Age: 60
End: 2025-02-20
Payer: COMMERCIAL

## 2025-02-20 DIAGNOSIS — N18.31 CHRONIC KIDNEY DISEASE (CKD) STAGE G3A/A1, MODERATELY DECREASED GLOMERULAR FILTRATION RATE (GFR) BETWEEN 45-59 ML/MIN/1.73 SQUARE METER AND ALBUMINURIA CREATININE RATIO LESS THAN 30 MG/G (CMS/H*: Primary | ICD-10-CM

## 2025-02-20 DIAGNOSIS — I12.9 HYPERTENSIVE NEPHROPATHY: ICD-10-CM

## 2025-02-20 DIAGNOSIS — N18.6 TYPE 2 DIABETES MELLITUS WITH ESRD (END-STAGE RENAL DISEASE): ICD-10-CM

## 2025-02-20 DIAGNOSIS — E11.22 TYPE 2 DIABETES MELLITUS WITH ESRD (END-STAGE RENAL DISEASE): ICD-10-CM

## 2025-03-07 ENCOUNTER — HOSPITAL ENCOUNTER (OUTPATIENT)
Dept: ULTRASOUND IMAGING | Facility: HOSPITAL | Age: 60
Discharge: HOME | End: 2025-03-07
Admitting: PHYSICIAN ASSISTANT
Payer: COMMERCIAL

## 2025-03-07 DIAGNOSIS — N18.6 TYPE 2 DIABETES MELLITUS WITH ESRD (END-STAGE RENAL DISEASE): ICD-10-CM

## 2025-03-07 DIAGNOSIS — N18.31 CHRONIC KIDNEY DISEASE (CKD) STAGE G3A/A1, MODERATELY DECREASED GLOMERULAR FILTRATION RATE (GFR) BETWEEN 45-59 ML/MIN/1.73 SQUARE METER AND ALBUMINURIA CREATININE RATIO LESS THAN 30 MG/G (CMS/H*: ICD-10-CM

## 2025-03-07 DIAGNOSIS — E11.22 TYPE 2 DIABETES MELLITUS WITH ESRD (END-STAGE RENAL DISEASE): ICD-10-CM

## 2025-03-07 DIAGNOSIS — I12.9 HYPERTENSIVE NEPHROPATHY: ICD-10-CM

## 2025-03-07 PROCEDURE — 76775 US EXAM ABDO BACK WALL LIM: CPT

## 2025-03-24 ENCOUNTER — TRANSCRIBE ORDERS (OUTPATIENT)
Dept: NUTRITION | Facility: HOSPITAL | Age: 60
End: 2025-03-24
Payer: COMMERCIAL

## 2025-03-24 DIAGNOSIS — N18.2 CHRONIC KIDNEY DISEASE, STAGE II (MILD): ICD-10-CM

## 2025-03-24 DIAGNOSIS — E87.1 HYPONATREMIA: ICD-10-CM

## 2025-03-24 DIAGNOSIS — E83.39 HYPERPHOSPHATEMIA: ICD-10-CM

## 2025-03-24 DIAGNOSIS — N18.2 DIABETES MELLITUS WITH STAGE 2 CHRONIC KIDNEY DISEASE: Primary | ICD-10-CM

## 2025-03-24 DIAGNOSIS — E87.6 HYPOKALEMIA: ICD-10-CM

## 2025-03-24 DIAGNOSIS — E11.22 DIABETES MELLITUS WITH STAGE 2 CHRONIC KIDNEY DISEASE: Primary | ICD-10-CM

## 2025-05-14 NOTE — TELEPHONE ENCOUNTER
Caller: Alisha Quintana    Relationship: Self    Best call back number: [phone]    Requested Prescriptions:   Requested Prescriptions     Pending Prescriptions Disp Refills    lisinopril-hydrochlorothiazide (PRINZIDE,ZESTORETIC) 20-12.5 MG per tablet 180 tablet 0     Sig: Take 2 tablets by mouth Daily.        Pharmacy where request should be sent: [facility] [id number] [address] [phone] [facility] [phone]      Last office visit with prescribing clinician: 1/15/2025   Last telemedicine visit with prescribing clinician: Visit date not found   Next office visit with prescribing clinician: Visit date not found     Additional details provided by patient: PT IS OUT AND NEEDS REFILLS. PLEASE CALL IN A 3 MONTH SUPPLY ASAP PT LEAVES FOR OUT OF TOWN ON SAT 5/17/25    Does the patient have less than a 3 day supply:  [x] Yes  [] No    Would you like a call back once the refill request has been completed: [x] Yes [] No    If the office needs to give you a call back, can they leave a voicemail: [x] Yes [] No    Alonzo Madison Rep   05/14/25 16:06 EDT

## 2025-05-15 RX ORDER — LISINOPRIL AND HYDROCHLOROTHIAZIDE 12.5; 2 MG/1; MG/1
2 TABLET ORAL DAILY
Qty: 180 TABLET | Refills: 0 | OUTPATIENT
Start: 2025-05-15

## 2025-05-16 RX ORDER — LISINOPRIL 40 MG/1
40 TABLET ORAL DAILY
Qty: 30 TABLET | Refills: 0 | Status: SHIPPED | OUTPATIENT
Start: 2025-05-16

## 2025-05-16 NOTE — TELEPHONE ENCOUNTER
Rx Refill Note  Requested Prescriptions     Pending Prescriptions Disp Refills    lisinopril (PRINIVIL,ZESTRIL) 40 MG tablet [Pharmacy Med Name: LISINOPRIL 40 MG TABLET] 30 tablet      Sig: TAKE 1 TABLET BY MOUTH DAILY      Last office visit with prescribing clinician: 1/15/2025   Last telemedicine visit with prescribing clinician: Visit date not found   Next office visit with prescribing clinician: Visit date not found                         Would you like a call back once the refill request has been completed: [] Yes [] No    If the office needs to give you a call back, can they leave a voicemail: [] Yes [] No    Conchita Ortiz MA  05/16/25, 08:51 EDT

## 2025-05-30 DIAGNOSIS — E03.9 ACQUIRED HYPOTHYROIDISM: ICD-10-CM

## 2025-06-02 RX ORDER — AMLODIPINE BESYLATE 5 MG/1
5 TABLET ORAL DAILY
Qty: 90 TABLET | OUTPATIENT
Start: 2025-06-02

## 2025-06-02 RX ORDER — LEVOTHYROXINE SODIUM 88 UG/1
88 TABLET ORAL DAILY
Qty: 90 TABLET | Refills: 0 | Status: SHIPPED | OUTPATIENT
Start: 2025-06-02

## 2025-06-02 NOTE — TELEPHONE ENCOUNTER
Rx Refill Note  Requested Prescriptions     Pending Prescriptions Disp Refills    levothyroxine (SYNTHROID, LEVOTHROID) 88 MCG tablet [Pharmacy Med Name: LEVOTHYROXINE 88 MCG TABLET] 90 tablet 0     Sig: TAKE 1 TABLET BY MOUTH DAILY     Refused Prescriptions Disp Refills    amLODIPine (NORVASC) 5 MG tablet [Pharmacy Med Name: amLODIPine BESYLATE 5 MG TAB] 90 tablet      Sig: TAKE 1 TABLET BY MOUTH DAILY     Refused By: GIOVANA JORDAN     Reason for Refusal: Duplicate renewal request      Last office visit with prescribing clinician: 1/15/2025   Last telemedicine visit with prescribing clinician: Visit date not found   Next office visit with prescribing clinician: 5/31/2025                         Would you like a call back once the refill request has been completed: [] Yes [] No    If the office needs to give you a call back, can they leave a voicemail: [] Yes [] No    Giovana Jordan MA  06/02/25, 09:14 EDT

## 2025-06-18 RX ORDER — LISINOPRIL 40 MG/1
40 TABLET ORAL DAILY
Qty: 30 TABLET | Refills: 2 | Status: SHIPPED | OUTPATIENT
Start: 2025-06-18

## 2025-06-18 NOTE — TELEPHONE ENCOUNTER
Rx Refill Note  Requested Prescriptions     Pending Prescriptions Disp Refills    lisinopril (PRINIVIL,ZESTRIL) 40 MG tablet 30 tablet 0     Sig: Take 1 tablet by mouth Daily.      Last office visit with prescribing clinician: 1/15/2025   Last telemedicine visit with prescribing clinician: Visit date not found   Next office visit with prescribing clinician: Visit date not found     Melly Acuna MA  06/18/25, 13:32 EDT

## 2025-08-01 ENCOUNTER — PATIENT MESSAGE (OUTPATIENT)
Dept: FAMILY MEDICINE CLINIC | Facility: CLINIC | Age: 60
End: 2025-08-01
Payer: COMMERCIAL

## 2025-08-01 RX ORDER — METFORMIN HYDROCHLORIDE 500 MG/1
1000 TABLET, EXTENDED RELEASE ORAL EVERY 12 HOURS SCHEDULED
Qty: 360 TABLET | Refills: 1 | Status: SHIPPED | OUTPATIENT
Start: 2025-08-01

## 2025-08-01 RX ORDER — LISINOPRIL 40 MG/1
40 TABLET ORAL DAILY
Qty: 90 TABLET | Refills: 1 | Status: SHIPPED | OUTPATIENT
Start: 2025-08-01

## 2025-08-20 DIAGNOSIS — E78.2 MIXED HYPERLIPIDEMIA: ICD-10-CM

## 2025-08-20 DIAGNOSIS — E11.9 TYPE 2 DIABETES MELLITUS WITHOUT COMPLICATION, WITHOUT LONG-TERM CURRENT USE OF INSULIN: ICD-10-CM

## 2025-08-20 RX ORDER — EZETIMIBE 10 MG/1
10 TABLET ORAL DAILY
Qty: 90 TABLET | Refills: 1 | Status: SHIPPED | OUTPATIENT
Start: 2025-08-20

## 2025-08-25 ENCOUNTER — OFFICE VISIT (OUTPATIENT)
Dept: FAMILY MEDICINE CLINIC | Facility: CLINIC | Age: 60
End: 2025-08-25
Payer: COMMERCIAL

## 2025-08-25 VITALS
SYSTOLIC BLOOD PRESSURE: 128 MMHG | BODY MASS INDEX: 28.25 KG/M2 | DIASTOLIC BLOOD PRESSURE: 88 MMHG | WEIGHT: 180 LBS | OXYGEN SATURATION: 98 % | HEIGHT: 67 IN | HEART RATE: 82 BPM

## 2025-08-25 DIAGNOSIS — E03.9 ACQUIRED HYPOTHYROIDISM: ICD-10-CM

## 2025-08-25 DIAGNOSIS — Z78.0 MENOPAUSE: ICD-10-CM

## 2025-08-25 DIAGNOSIS — Z23 IMMUNIZATION DUE: ICD-10-CM

## 2025-08-25 DIAGNOSIS — E78.2 MIXED HYPERLIPIDEMIA: ICD-10-CM

## 2025-08-25 DIAGNOSIS — I10 PRIMARY HYPERTENSION: ICD-10-CM

## 2025-08-25 DIAGNOSIS — E11.9 TYPE 2 DIABETES MELLITUS WITHOUT COMPLICATION, WITHOUT LONG-TERM CURRENT USE OF INSULIN: ICD-10-CM

## 2025-08-25 DIAGNOSIS — Z12.31 ENCOUNTER FOR SCREENING MAMMOGRAM FOR MALIGNANT NEOPLASM OF BREAST: ICD-10-CM

## 2025-08-25 DIAGNOSIS — N18.31 STAGE 3A CHRONIC KIDNEY DISEASE: ICD-10-CM

## 2025-08-25 DIAGNOSIS — Z00.00 ANNUAL PHYSICAL EXAM: Primary | ICD-10-CM

## 2025-08-26 ENCOUNTER — LAB (OUTPATIENT)
Dept: LAB | Facility: HOSPITAL | Age: 60
End: 2025-08-26
Payer: COMMERCIAL

## 2025-08-26 DIAGNOSIS — I10 PRIMARY HYPERTENSION: ICD-10-CM

## 2025-08-26 DIAGNOSIS — Z00.00 ANNUAL PHYSICAL EXAM: ICD-10-CM

## 2025-08-26 DIAGNOSIS — E11.9 TYPE 2 DIABETES MELLITUS WITHOUT COMPLICATION, WITHOUT LONG-TERM CURRENT USE OF INSULIN: ICD-10-CM

## 2025-08-26 DIAGNOSIS — E78.2 MIXED HYPERLIPIDEMIA: ICD-10-CM

## 2025-08-26 DIAGNOSIS — N18.31 STAGE 3A CHRONIC KIDNEY DISEASE: ICD-10-CM

## 2025-08-26 DIAGNOSIS — E03.9 ACQUIRED HYPOTHYROIDISM: ICD-10-CM

## 2025-08-26 LAB
ALBUMIN SERPL-MCNC: 4.3 G/DL (ref 3.5–5.2)
ALBUMIN UR-MCNC: <1.2 MG/DL
ALBUMIN/GLOB SERPL: 1.5 G/DL
ALP SERPL-CCNC: 88 U/L (ref 39–117)
ALT SERPL W P-5'-P-CCNC: 11 U/L (ref 1–33)
ANION GAP SERPL CALCULATED.3IONS-SCNC: 14 MMOL/L (ref 5–15)
AST SERPL-CCNC: 18 U/L (ref 1–32)
BILIRUB SERPL-MCNC: 0.4 MG/DL (ref 0–1.2)
BUN SERPL-MCNC: 9 MG/DL (ref 6–20)
BUN/CREAT SERPL: 7.8 (ref 7–25)
CALCIUM SPEC-SCNC: 9.5 MG/DL (ref 8.6–10.5)
CHLORIDE SERPL-SCNC: 102 MMOL/L (ref 98–107)
CHOLEST SERPL-MCNC: 153 MG/DL (ref 0–200)
CO2 SERPL-SCNC: 23 MMOL/L (ref 22–29)
CREAT SERPL-MCNC: 1.15 MG/DL (ref 0.57–1)
CREAT UR-MCNC: 89.9 MG/DL
DEPRECATED RDW RBC AUTO: 42.3 FL (ref 37–54)
EGFRCR SERPLBLD CKD-EPI 2021: 55.3 ML/MIN/1.73
ERYTHROCYTE [DISTWIDTH] IN BLOOD BY AUTOMATED COUNT: 12.8 % (ref 12.3–15.4)
GLOBULIN UR ELPH-MCNC: 2.9 GM/DL
GLUCOSE SERPL-MCNC: 86 MG/DL (ref 65–99)
HBA1C MFR BLD: 5.9 % (ref 4.8–5.6)
HCT VFR BLD AUTO: 37 % (ref 34–46.6)
HDLC SERPL-MCNC: 65 MG/DL (ref 40–60)
HGB BLD-MCNC: 11.8 G/DL (ref 12–15.9)
LDLC SERPL CALC-MCNC: 68 MG/DL (ref 0–100)
LDLC/HDLC SERPL: 1.02 {RATIO}
MCH RBC QN AUTO: 28.8 PG (ref 26.6–33)
MCHC RBC AUTO-ENTMCNC: 31.9 G/DL (ref 31.5–35.7)
MCV RBC AUTO: 90.2 FL (ref 79–97)
MICROALBUMIN/CREAT UR: NORMAL MG/G{CREAT}
PLATELET # BLD AUTO: 237 10*3/MM3 (ref 140–450)
PMV BLD AUTO: 10.4 FL (ref 6–12)
POTASSIUM SERPL-SCNC: 4.3 MMOL/L (ref 3.5–5.2)
PROT SERPL-MCNC: 7.2 G/DL (ref 6–8.5)
RBC # BLD AUTO: 4.1 10*6/MM3 (ref 3.77–5.28)
SODIUM SERPL-SCNC: 139 MMOL/L (ref 136–145)
TRIGL SERPL-MCNC: 110 MG/DL (ref 0–150)
TSH SERPL DL<=0.05 MIU/L-ACNC: 1 UIU/ML (ref 0.27–4.2)
VLDLC SERPL-MCNC: 20 MG/DL (ref 5–40)
WBC NRBC COR # BLD AUTO: 7.85 10*3/MM3 (ref 3.4–10.8)

## 2025-08-26 PROCEDURE — 80061 LIPID PANEL: CPT

## 2025-08-26 PROCEDURE — 82570 ASSAY OF URINE CREATININE: CPT

## 2025-08-26 PROCEDURE — 80050 GENERAL HEALTH PANEL: CPT

## 2025-08-26 PROCEDURE — 82043 UR ALBUMIN QUANTITATIVE: CPT

## 2025-08-26 PROCEDURE — 83036 HEMOGLOBIN GLYCOSYLATED A1C: CPT

## 2025-08-26 RX ORDER — SEMAGLUTIDE 2.68 MG/ML
INJECTION, SOLUTION SUBCUTANEOUS
Qty: 9 ML | Refills: 1 | Status: SHIPPED | OUTPATIENT
Start: 2025-08-26

## 2025-08-27 DIAGNOSIS — E03.9 ACQUIRED HYPOTHYROIDISM: ICD-10-CM

## 2025-08-27 RX ORDER — ATORVASTATIN CALCIUM 80 MG/1
80 TABLET, FILM COATED ORAL DAILY
Qty: 90 TABLET | Refills: 3 | Status: SHIPPED | OUTPATIENT
Start: 2025-08-27

## 2025-08-27 RX ORDER — LEVOTHYROXINE SODIUM 88 UG/1
88 TABLET ORAL DAILY
Qty: 90 TABLET | Refills: 0 | OUTPATIENT
Start: 2025-08-27

## 2025-08-27 RX ORDER — LEVOTHYROXINE SODIUM 88 UG/1
88 TABLET ORAL DAILY
Qty: 90 TABLET | Refills: 0 | Status: SHIPPED | OUTPATIENT
Start: 2025-08-27

## 2025-08-27 RX ORDER — AMLODIPINE BESYLATE 5 MG/1
5 TABLET ORAL DAILY
Qty: 90 TABLET | Refills: 3 | Status: SHIPPED | OUTPATIENT
Start: 2025-08-27